# Patient Record
Sex: FEMALE | Race: WHITE | Employment: OTHER | URBAN - NONMETROPOLITAN AREA
[De-identification: names, ages, dates, MRNs, and addresses within clinical notes are randomized per-mention and may not be internally consistent; named-entity substitution may affect disease eponyms.]

---

## 2017-01-10 ENCOUNTER — LAB ENCOUNTER (OUTPATIENT)
Dept: LAB | Age: 82
End: 2017-01-10
Attending: FAMILY MEDICINE
Payer: MEDICARE

## 2017-01-10 ENCOUNTER — OFFICE VISIT (OUTPATIENT)
Dept: FAMILY MEDICINE CLINIC | Facility: CLINIC | Age: 82
End: 2017-01-10

## 2017-01-10 VITALS
BODY MASS INDEX: 30 KG/M2 | DIASTOLIC BLOOD PRESSURE: 60 MMHG | OXYGEN SATURATION: 95 % | HEART RATE: 84 BPM | TEMPERATURE: 98 F | SYSTOLIC BLOOD PRESSURE: 120 MMHG | WEIGHT: 168.25 LBS

## 2017-01-10 DIAGNOSIS — R53.81 OTHER MALAISE AND FATIGUE: ICD-10-CM

## 2017-01-10 DIAGNOSIS — R53.83 OTHER MALAISE AND FATIGUE: ICD-10-CM

## 2017-01-10 DIAGNOSIS — R53.83 OTHER MALAISE AND FATIGUE: Primary | ICD-10-CM

## 2017-01-10 DIAGNOSIS — E78.5 HYPERLIPIDEMIA, UNSPECIFIED HYPERLIPIDEMIA TYPE: ICD-10-CM

## 2017-01-10 DIAGNOSIS — R53.81 OTHER MALAISE AND FATIGUE: Primary | ICD-10-CM

## 2017-01-10 LAB
ALBUMIN SERPL-MCNC: 3.5 G/DL (ref 3.5–4.8)
ALP LIVER SERPL-CCNC: 40 U/L (ref 55–142)
ALT SERPL-CCNC: 40 U/L (ref 14–54)
AST SERPL-CCNC: 18 U/L (ref 15–41)
BASOPHILS # BLD AUTO: 0.04 X10(3) UL (ref 0–0.1)
BASOPHILS NFR BLD AUTO: 0.5 %
BILIRUB SERPL-MCNC: 0.3 MG/DL (ref 0.1–2)
BUN BLD-MCNC: 21 MG/DL (ref 8–20)
CALCIUM BLD-MCNC: 9.2 MG/DL (ref 8.3–10.3)
CHLORIDE: 105 MMOL/L (ref 101–111)
CHOLEST SMN-MCNC: 229 MG/DL (ref ?–200)
CO2: 31 MMOL/L (ref 22–32)
CREAT BLD-MCNC: 1.02 MG/DL (ref 0.55–1.02)
EOSINOPHIL # BLD AUTO: 0.2 X10(3) UL (ref 0–0.3)
EOSINOPHIL NFR BLD AUTO: 2.4 %
ERYTHROCYTE [DISTWIDTH] IN BLOOD BY AUTOMATED COUNT: 12.4 % (ref 11.5–16)
GLUCOSE BLD-MCNC: 71 MG/DL (ref 70–99)
HCT VFR BLD AUTO: 45 % (ref 34–50)
HDLC SERPL-MCNC: 73 MG/DL (ref 45–?)
HDLC SERPL: 3.14 {RATIO} (ref ?–4.44)
HGB BLD-MCNC: 14.8 G/DL (ref 12–16)
IMMATURE GRANULOCYTE COUNT: 0.06 X10(3) UL (ref 0–1)
IMMATURE GRANULOCYTE RATIO %: 0.7 %
LDLC SERPL CALC-MCNC: 101 MG/DL (ref ?–130)
LYMPHOCYTES # BLD AUTO: 1.72 X10(3) UL (ref 0.9–4)
LYMPHOCYTES NFR BLD AUTO: 20.5 %
M PROTEIN MFR SERPL ELPH: 6.5 G/DL (ref 6.1–8.3)
MCH RBC QN AUTO: 31.9 PG (ref 27–33.2)
MCHC RBC AUTO-ENTMCNC: 32.9 G/DL (ref 31–37)
MCV RBC AUTO: 97 FL (ref 81–100)
MONOCYTES # BLD AUTO: 0.87 X10(3) UL (ref 0.1–0.6)
MONOCYTES NFR BLD AUTO: 10.4 %
NEUTROPHIL ABS PRELIM: 5.49 X10 (3) UL (ref 1.3–6.7)
NEUTROPHILS # BLD AUTO: 5.49 X10(3) UL (ref 1.3–6.7)
NEUTROPHILS NFR BLD AUTO: 65.5 %
NONHDLC SERPL-MCNC: 156 MG/DL (ref ?–130)
PLATELET # BLD AUTO: 317 10(3)UL (ref 150–450)
POTASSIUM SERPL-SCNC: 4.4 MMOL/L (ref 3.6–5.1)
RBC # BLD AUTO: 4.64 X10(6)UL (ref 3.8–5.1)
RED CELL DISTRIBUTION WIDTH-SD: 43.9 FL (ref 35.1–46.3)
SODIUM SERPL-SCNC: 140 MMOL/L (ref 136–144)
TRIGLYCERIDES: 275 MG/DL (ref ?–150)
VLDL: 55 MG/DL (ref 5–40)
WBC # BLD AUTO: 8.4 X10(3) UL (ref 4–13)

## 2017-01-10 PROCEDURE — 99214 OFFICE O/P EST MOD 30 MIN: CPT | Performed by: FAMILY MEDICINE

## 2017-01-10 PROCEDURE — 36415 COLL VENOUS BLD VENIPUNCTURE: CPT

## 2017-01-10 PROCEDURE — 85025 COMPLETE CBC W/AUTO DIFF WBC: CPT

## 2017-01-10 PROCEDURE — 80053 COMPREHEN METABOLIC PANEL: CPT

## 2017-01-10 PROCEDURE — 36415 COLL VENOUS BLD VENIPUNCTURE: CPT | Performed by: FAMILY MEDICINE

## 2017-01-10 PROCEDURE — 80061 LIPID PANEL: CPT

## 2017-01-10 NOTE — PROGRESS NOTES
Van Johnson is a 80year old female. Patient presents with: Other: f/up from 12/30/16 on bronchitis. .pt still very weak, pt still coughing some. ... [employer] [facility] room 1      HPI:   Patient continues to have a cough which is worse in the morning.   She feels very prolapse, incomplete 1/12/2012     Cystocele w/incomplete uterine prolaspse   • Vitamin D deficiency 7/26/2012         Past Surgical History    TONSILLECTOMY      CHOLECYSTECTOMY      Comment gallbladder    COLONOSCOPY      Comment 3/2008 diverticulosis fa antidepressant.       Orders Placed This Encounter  CBC W Differential W Platelet [E]  Comp Metabolic Panel (14) [E]  Lipid Panel [E]  *Venipuncture    Meds & Refills for this Visit:  No prescriptions requested or ordered in this encounter    Imaging & Cons

## 2017-01-11 NOTE — PROGRESS NOTES
Quick Note:    Notify lipids normal. Recheck in 6 months. Notify chem normal including kidney function, liver function, electrolytes and nutritional markers. Recheck comprehensive panel in 6 months.   Notify CBC normal. No evidence of anemia or of an acut

## 2017-01-12 ENCOUNTER — TELEPHONE (OUTPATIENT)
Dept: FAMILY MEDICINE CLINIC | Facility: CLINIC | Age: 82
End: 2017-01-12

## 2017-01-12 DIAGNOSIS — R06.00 EXERTIONAL DYSPNEA: Primary | ICD-10-CM

## 2017-01-12 NOTE — TELEPHONE ENCOUNTER
Per [place] message the patient can go next Wednesday morning  Calling [facility] central scheduling- had to leave a detailed message.  Asked that they call me or the patient directly

## 2017-01-12 NOTE — TELEPHONE ENCOUNTER
Future Appointments  Date Time Provider Corona Olivo   1/18/2017 8:00 AM [facility] CARD TM/STRESS/ECHO RM 1 YK CARD Mike   5/22/2017 1:00 PM Zachary Mehta, DO [facility] [place]     I had used fatigue for the echo? What other diagnosis can I use?

## 2017-01-18 ENCOUNTER — HOSPITAL ENCOUNTER (OUTPATIENT)
Dept: CV DIAGNOSTICS | Age: 82
Discharge: HOME | End: 2017-01-18
Attending: FAMILY MEDICINE
Payer: MEDICARE

## 2017-01-18 DIAGNOSIS — R06.00 EXERTIONAL DYSPNEA: ICD-10-CM

## 2017-01-18 PROCEDURE — 93306 TTE W/DOPPLER COMPLETE: CPT | Performed by: INTERNAL MEDICINE

## 2017-01-18 NOTE — PROGRESS NOTES
Quick Note:    Notify echocardiogram normal. No evidence of valve problems or heart failure.  ______

## 2017-01-31 RX ORDER — SIMVASTATIN 20 MG
20 TABLET ORAL NIGHTLY
Qty: 90 TABLET | Refills: 1 | Status: SHIPPED | OUTPATIENT
Start: 2017-01-31 | End: 2017-05-06

## 2017-01-31 NOTE — TELEPHONE ENCOUNTER
From: Amy Sánchez  To: Carlos Nicole DO  Sent: 1/31/2017 8:28 AM CST  Subject: Medication Renewal Request    Original authorizing provider: DO Jamie Woo would like a refill of the following medications:  simvastatin

## 2017-03-15 ENCOUNTER — TELEPHONE (OUTPATIENT)
Dept: FAMILY MEDICINE CLINIC | Facility: CLINIC | Age: 82
End: 2017-03-15

## 2017-03-15 RX ORDER — LEVOTHYROXINE SODIUM 112 UG/1
TABLET ORAL
Qty: 90 TABLET | Refills: 1 | Status: SHIPPED
Start: 2017-03-15 | End: 2017-08-09 | Stop reason: DRUGHIGH

## 2017-03-15 NOTE — TELEPHONE ENCOUNTER
From: Ana Sanchez  To: Aris Prescott DO  Sent: 3/15/2017 11:27 AM CDT  Subject: Medication Renewal Request    Original authorizing provider: DO Pat Rosenberg would like a refill of the following medications:  Levothyroxi

## 2017-03-15 NOTE — TELEPHONE ENCOUNTER
She received a response to her med refill, but it was saying she had to open in [place] script and she doesn't know ho to do that.      I advised that she was just receiving a message advising that her script was filled and send to [facility]ELA FRANCIS[facility]   Patient verbalize

## 2017-03-21 ENCOUNTER — MED REC SCAN ONLY (OUTPATIENT)
Dept: FAMILY MEDICINE CLINIC | Facility: CLINIC | Age: 82
End: 2017-03-21

## 2017-05-06 RX ORDER — SIMVASTATIN 20 MG
20 TABLET ORAL NIGHTLY
Qty: 90 TABLET | Refills: 0 | Status: SHIPPED | OUTPATIENT
Start: 2017-05-06 | End: 2017-05-22

## 2017-05-06 NOTE — TELEPHONE ENCOUNTER
Last OV: 1/10/2017  Last labs: 1/10/2017  Last filled: 1/31/2017 #90 w 1RF    Letter sent for pt to schedule labs

## 2017-05-06 NOTE — TELEPHONE ENCOUNTER
From: Grey Carrillo  To: Mine Maldonado DO  Sent: 5/5/2017 9:27 PM CDT  Subject: Medication Renewal Request    Original authorizing provider: Efraim Challenger, DO Aneita Collet Bulson would like a refill of the following medications:  simvastatin 2

## 2017-05-22 ENCOUNTER — OFFICE VISIT (OUTPATIENT)
Dept: FAMILY MEDICINE CLINIC | Facility: CLINIC | Age: 82
End: 2017-05-22

## 2017-05-22 VITALS
SYSTOLIC BLOOD PRESSURE: 120 MMHG | TEMPERATURE: 98 F | DIASTOLIC BLOOD PRESSURE: 64 MMHG | HEIGHT: 62.5 IN | BODY MASS INDEX: 31.78 KG/M2 | HEART RATE: 87 BPM | OXYGEN SATURATION: 97 % | WEIGHT: 177.13 LBS

## 2017-05-22 DIAGNOSIS — Z00.00 ENCOUNTER FOR ANNUAL HEALTH EXAMINATION: Primary | ICD-10-CM

## 2017-05-22 DIAGNOSIS — E03.9 HYPOTHYROIDISM, UNSPECIFIED TYPE: ICD-10-CM

## 2017-05-22 DIAGNOSIS — E78.5 HYPERLIPIDEMIA, UNSPECIFIED HYPERLIPIDEMIA TYPE: ICD-10-CM

## 2017-05-22 DIAGNOSIS — H35.3290 EXUDATIVE SENILE MACULAR DEGENERATION OF RETINA (HCC): ICD-10-CM

## 2017-05-22 DIAGNOSIS — Z13.31 DEPRESSION SCREENING: ICD-10-CM

## 2017-05-22 PROCEDURE — G0444 DEPRESSION SCREEN ANNUAL: HCPCS | Performed by: FAMILY MEDICINE

## 2017-05-22 PROCEDURE — G0439 PPPS, SUBSEQ VISIT: HCPCS | Performed by: FAMILY MEDICINE

## 2017-05-22 RX ORDER — SIMVASTATIN 20 MG
20 TABLET ORAL NIGHTLY
Qty: 90 TABLET | Refills: 0 | COMMUNITY
Start: 2017-05-22 | End: 2017-08-04

## 2017-05-22 NOTE — PATIENT INSTRUCTIONS
Gary Cook's SCREENING SCHEDULE   Tests on this list are recommended by your physician but may not be covered, or covered at this frequency, by your insurer. Please check with your insurance carrier before scheduling to verify coverage.    PREVENTAT lifetime   • Anyone with a family history    Colorectal Cancer Screening  Covered up to Age 76     Colonoscopy Screen   Covered every 10 years- more often if abnormal Colonoscopy,10 Years due on 09/28/1980 Update Health Maintenance if applicable    Flex Si or performed in visit on 11/23/15  -FLU VACC [address] ANTIG   Orders placed or performed in visit on 10/28/14  -FLU VACC PRSV FREE INC ANTIG   Orders placed or performed in visit on 10/04/13  -INFLUENZA VIRUS VACCINE, PRESERV FREE, >=1YEARS OF AGE the different types of Advance Directives. It also has the State forms available on it's website for anyone to review and print using their home computer and printer. (the forms are also available in [address])  www. [employer]. org  This link also has inf

## 2017-05-22 NOTE — PROGRESS NOTES
HPI:   Corona Fuentes is a 80year old female who presents for a Medicare Subsequent Annual Wellness visit (Pt already had Initial Annual Wellness).     No complaints      Patient Care Team: Patient Care Team:  Sascha Gama DO as PCP - General (F her father; Juan Jose George in her brother and mother; Eye Problems in her mother; Heart Attack (age of onset: 67) in her father. SOCIAL HISTORY:   She  reports that she quit smoking about 52 years ago. Her smoking use included Cigarettes.  She has a 4.5 pack-year 11/30/2006   • TDAP 06/28/2016   • Unclassified Drug, Admin In Office 07/02/2013, 07/30/2013, 08/28/2013, 10/01/2013, 11/05/2013, 12/03/2013   • Zoster (Shingles) 03/16/2017       ASSESSMENT AND OTHER RELEVANT CHRONIC CONDITIONS:   Colon Ek is a 80 Fall/Risk Assessment                                                              Depression Screening (PHQ-2/PHQ-9): Over the LAST 2 WEEKS                      Advance Directives                Please go to \"Cognitive [employer]es Maintenance if applicable    Chlamydia  Annually if high risk No results found for: CHLAMYDIA No flowsheet data found.     Screening Mammogram      Mammogram Annually to 76, then as discussed Mammogram,1 Yr due on 03/17/2017 Update Health Maintenance if dorina found.    Diabetes      HgbA1C  Annually No results found for: A1C No flowsheet data found.     Creat/alb ratio  Annually      LDL  Annually LDL CHOLESTEROL (mg/dL)   Date Value   01/10/2017 101     LDL CHOLESTROL (mg/dL)   Date Value   01/29/2014 125    No

## 2017-06-08 DIAGNOSIS — E03.9 HYPOTHYROIDISM, UNSPECIFIED TYPE: Primary | ICD-10-CM

## 2017-06-08 RX ORDER — LEVOTHYROXINE SODIUM 112 UG/1
TABLET ORAL
Qty: 90 TABLET | Refills: 0 | Status: CANCELLED | OUTPATIENT
Start: 2017-06-08

## 2017-06-08 NOTE — TELEPHONE ENCOUNTER
Last office visit 5-22-17  Last refill 3-15-17 #90 with 1 refill. Last TSH 8-25-16. Repeat one year.

## 2017-06-08 NOTE — TELEPHONE ENCOUNTER
From: Freda Aggarwal  To: Fabienne Sommers DO  Sent: 6/8/2017 8:44 AM CDT  Subject: Medication Renewal Request    Original authorizing provider: DO Gibran Verma would like a refill of the following medications:  Levothyroxine

## 2017-06-09 ENCOUNTER — PATIENT MESSAGE (OUTPATIENT)
Dept: FAMILY MEDICINE CLINIC | Facility: CLINIC | Age: 82
End: 2017-06-09

## 2017-06-09 NOTE — TELEPHONE ENCOUNTER
From: Freda Aggarwal  To: Fabienne Sommers DO  Sent: 6/9/2017 9:26 AM CDT  Subject: Other    [address] called and said that my prescription is ready to be picked up. So, I guess it's okay.   thanks  [employer]

## 2017-07-05 ENCOUNTER — OFFICE VISIT (OUTPATIENT)
Dept: FAMILY MEDICINE CLINIC | Facility: CLINIC | Age: 82
End: 2017-07-05

## 2017-07-05 VITALS
BODY MASS INDEX: 31 KG/M2 | HEART RATE: 76 BPM | DIASTOLIC BLOOD PRESSURE: 60 MMHG | RESPIRATION RATE: 14 BRPM | WEIGHT: 174 LBS | SYSTOLIC BLOOD PRESSURE: 128 MMHG | TEMPERATURE: 98 F

## 2017-07-05 DIAGNOSIS — M25.511 ACUTE PAIN OF RIGHT SHOULDER: ICD-10-CM

## 2017-07-05 DIAGNOSIS — M25.561 ACUTE PAIN OF RIGHT KNEE: Primary | ICD-10-CM

## 2017-07-05 PROCEDURE — 99214 OFFICE O/P EST MOD 30 MIN: CPT | Performed by: FAMILY MEDICINE

## 2017-07-05 NOTE — PROGRESS NOTES
Rock Cha is a 80year old female. Patient presents with:  Shoulder Pain: right shoulder pain per pt  Knee Pain: right knee pain per pt      HPI:   Patient complains of pain to her right knee and her right shoulder. No specific injury.   She had fa Dexa   • Uterovaginal prolapse, incomplete 1/12/2012    Cystocele w/incomplete uterine prolaspse   • Vitamin D deficiency 7/26/2012     Past Surgical History:  No date: CHOLECYSTECTOMY      Comment: gallbladder  No date: COLONOSCOPY      Comment: 3/2008 eric pain of right knee  (primary encounter diagnosis)  Acute pain of right shoulder    I suspect she has osteoarthritis in the knee and likely in the shoulder as well. Recommend she try 2 extra strength Tylenol every morning and 2 every evening.   The stairs a

## 2017-08-04 RX ORDER — SIMVASTATIN 20 MG
20 TABLET ORAL NIGHTLY
Qty: 90 TABLET | Refills: 0 | Status: SHIPPED | OUTPATIENT
Start: 2017-08-04 | End: 2017-10-28

## 2017-08-04 NOTE — TELEPHONE ENCOUNTER
From: Jennifer Paul  Sent: 8/4/2017 9:53 AM CDT  Subject: Medication Renewal Request    Jennifer Paul would like a refill of the following medications:  simvastatin 20 MG Oral Tab    Preferred pharmacy: [address]

## 2017-08-08 ENCOUNTER — LABORATORY ENCOUNTER (OUTPATIENT)
Dept: LAB | Age: 82
End: 2017-08-08
Attending: FAMILY MEDICINE
Payer: MEDICARE

## 2017-08-08 DIAGNOSIS — R53.81 OTHER MALAISE: Primary | ICD-10-CM

## 2017-08-08 DIAGNOSIS — E55.9 VITAMIN D DEFICIENCY: ICD-10-CM

## 2017-08-08 DIAGNOSIS — Z79.899 ENCOUNTER FOR LONG-TERM (CURRENT) DRUG USE: ICD-10-CM

## 2017-08-08 DIAGNOSIS — E03.9 HYPOTHYROIDISM, UNSPECIFIED TYPE: ICD-10-CM

## 2017-08-08 DIAGNOSIS — R53.83 FATIGUE: ICD-10-CM

## 2017-08-08 LAB
25-HYDROXYVITAMIN D (TOTAL): 42.3 NG/ML (ref 30–100)
ALBUMIN SERPL-MCNC: 3.7 G/DL (ref 3.5–4.8)
ALP LIVER SERPL-CCNC: 39 U/L (ref 55–142)
ALT SERPL-CCNC: 19 U/L (ref 14–54)
AST SERPL-CCNC: 16 U/L (ref 15–41)
BASOPHILS # BLD AUTO: 0.03 X10(3) UL (ref 0–0.1)
BASOPHILS NFR BLD AUTO: 0.7 %
BILIRUB SERPL-MCNC: 0.4 MG/DL (ref 0.1–2)
BUN BLD-MCNC: 23 MG/DL (ref 8–20)
CALCIUM BLD-MCNC: 8.9 MG/DL (ref 8.3–10.3)
CHLORIDE: 106 MMOL/L (ref 101–111)
CHOLEST SMN-MCNC: 243 MG/DL (ref ?–200)
CO2: 30 MMOL/L (ref 22–32)
CREAT BLD-MCNC: 0.97 MG/DL (ref 0.55–1.02)
EOSINOPHIL # BLD AUTO: 0.1 X10(3) UL (ref 0–0.3)
EOSINOPHIL NFR BLD AUTO: 2.3 %
ERYTHROCYTE [DISTWIDTH] IN BLOOD BY AUTOMATED COUNT: 12.7 % (ref 11.5–16)
GLUCOSE BLD-MCNC: 92 MG/DL (ref 70–99)
HCT VFR BLD AUTO: 41.4 % (ref 34–50)
HDLC SERPL-MCNC: 75 MG/DL (ref 45–?)
HDLC SERPL: 3.24 {RATIO} (ref ?–4.44)
HGB BLD-MCNC: 13.1 G/DL (ref 12–16)
IMMATURE GRANULOCYTE COUNT: 0.03 X10(3) UL (ref 0–1)
IMMATURE GRANULOCYTE RATIO %: 0.7 %
LDLC SERPL CALC-MCNC: 135 MG/DL (ref ?–130)
LDLC SERPL-MCNC: 33 MG/DL (ref 5–40)
LYMPHOCYTES # BLD AUTO: 1.18 X10(3) UL (ref 0.9–4)
LYMPHOCYTES NFR BLD AUTO: 27.3 %
M PROTEIN MFR SERPL ELPH: 7 G/DL (ref 6.1–8.3)
MCH RBC QN AUTO: 30.3 PG (ref 27–33.2)
MCHC RBC AUTO-ENTMCNC: 31.6 G/DL (ref 31–37)
MCV RBC AUTO: 95.6 FL (ref 81–100)
MONOCYTES # BLD AUTO: 0.41 X10(3) UL (ref 0.1–0.6)
MONOCYTES NFR BLD AUTO: 9.5 %
NEUTROPHIL ABS PRELIM: 2.58 X10 (3) UL (ref 1.3–6.7)
NEUTROPHILS # BLD AUTO: 2.58 X10(3) UL (ref 1.3–6.7)
NEUTROPHILS NFR BLD AUTO: 59.5 %
NONHDLC SERPL-MCNC: 168 MG/DL (ref ?–130)
PLATELET # BLD AUTO: 245 10(3)UL (ref 150–450)
POTASSIUM SERPL-SCNC: 4.3 MMOL/L (ref 3.6–5.1)
RBC # BLD AUTO: 4.33 X10(6)UL (ref 3.8–5.1)
RED CELL DISTRIBUTION WIDTH-SD: 44.7 FL (ref 35.1–46.3)
SODIUM SERPL-SCNC: 143 MMOL/L (ref 136–144)
TRIGLYCERIDES: 167 MG/DL (ref ?–150)
TSI SER-ACNC: 0.2 MIU/ML (ref 0.35–5.5)
WBC # BLD AUTO: 4.3 X10(3) UL (ref 4–13)

## 2017-08-08 PROCEDURE — 82306 VITAMIN D 25 HYDROXY: CPT

## 2017-08-08 PROCEDURE — 84443 ASSAY THYROID STIM HORMONE: CPT

## 2017-08-08 PROCEDURE — 80061 LIPID PANEL: CPT

## 2017-08-08 PROCEDURE — 36415 COLL VENOUS BLD VENIPUNCTURE: CPT

## 2017-08-08 PROCEDURE — 80053 COMPREHEN METABOLIC PANEL: CPT

## 2017-08-08 PROCEDURE — 85025 COMPLETE CBC W/AUTO DIFF WBC: CPT

## 2017-08-09 DIAGNOSIS — E03.9 ACQUIRED HYPOTHYROIDISM: Primary | ICD-10-CM

## 2017-08-09 RX ORDER — LEVOTHYROXINE SODIUM 0.1 MG/1
100 TABLET ORAL
Qty: 90 TABLET | Refills: 0 | Status: SHIPPED | OUTPATIENT
Start: 2017-08-09 | End: 2017-10-28

## 2017-08-09 NOTE — PROGRESS NOTES
Notify thyroid is overtreated. Need to decrease her thyroid to 100 mcg daily. Recheck TSH in 6 weeks. Blood count is normal.  Chemistry profile is normal.  Vitamin D level is normal.  Cholesterol has increased.   Please ask if she is taking the simvastat

## 2017-08-10 ENCOUNTER — PATIENT MESSAGE (OUTPATIENT)
Dept: FAMILY MEDICINE CLINIC | Facility: CLINIC | Age: 82
End: 2017-08-10

## 2017-08-10 NOTE — TELEPHONE ENCOUNTER
From: Issac Rodarte  To: Tamy Irene DO  Sent: 8/10/2017 11:11 AM CDT  Subject: Test Results Question    This is all too complicated for me - just wondering how my sugar intake is? ?

## 2017-09-19 ENCOUNTER — LABORATORY ENCOUNTER (OUTPATIENT)
Dept: LAB | Age: 82
End: 2017-09-19
Attending: FAMILY MEDICINE
Payer: MEDICARE

## 2017-09-19 DIAGNOSIS — E03.9 ACQUIRED HYPOTHYROIDISM: ICD-10-CM

## 2017-09-19 LAB — TSI SER-ACNC: 0.48 MIU/ML (ref 0.35–5.5)

## 2017-09-19 PROCEDURE — 84443 ASSAY THYROID STIM HORMONE: CPT

## 2017-09-19 PROCEDURE — 36415 COLL VENOUS BLD VENIPUNCTURE: CPT

## 2017-10-10 ENCOUNTER — OFFICE VISIT (OUTPATIENT)
Dept: FAMILY MEDICINE CLINIC | Facility: CLINIC | Age: 82
End: 2017-10-10

## 2017-10-10 VITALS
WEIGHT: 176.13 LBS | TEMPERATURE: 97 F | DIASTOLIC BLOOD PRESSURE: 64 MMHG | HEART RATE: 8 BPM | BODY MASS INDEX: 32 KG/M2 | SYSTOLIC BLOOD PRESSURE: 130 MMHG | OXYGEN SATURATION: 97 %

## 2017-10-10 DIAGNOSIS — M15.9 PRIMARY OSTEOARTHRITIS INVOLVING MULTIPLE JOINTS: Primary | ICD-10-CM

## 2017-10-10 DIAGNOSIS — G47.9 SLEEP DIFFICULTIES: ICD-10-CM

## 2017-10-10 PROCEDURE — 90653 IIV ADJUVANT VACCINE IM: CPT | Performed by: FAMILY MEDICINE

## 2017-10-10 PROCEDURE — 99213 OFFICE O/P EST LOW 20 MIN: CPT | Performed by: FAMILY MEDICINE

## 2017-10-10 PROCEDURE — G0008 ADMIN INFLUENZA VIRUS VAC: HCPCS | Performed by: FAMILY MEDICINE

## 2017-10-10 NOTE — PROGRESS NOTES
Gogo Lemus is a 80year old female. Patient presents with: Other: Messaged  a few weeks ago wanting to discuss knee, shoulder , lower back pain. In the morning Pt doesn't feel good in the morning but feels better as the day continues.   Room 2 1/12/2012    Cystocele w/incomplete uterine prolaspse   • Vitamin D deficiency 7/26/2012     Past Surgical History:  No date: CHOLECYSTECTOMY      Comment: gallbladder  No date: COLONOSCOPY      Comment: 3/2008 diverticulosis mayo  No date: TONSILLECTOMY discussed her bowel movements. She is to drink more water.     Orders Placed This Encounter      FLU VACCINE ADJUVANT IM    Meds & Refills for this Visit:  No prescriptions requested or ordered in this encounter    Imaging & Consults:  FLU VACCINE ADJUVANT

## 2017-10-28 DIAGNOSIS — E03.9 ACQUIRED HYPOTHYROIDISM: ICD-10-CM

## 2017-10-28 RX ORDER — LEVOTHYROXINE SODIUM 0.1 MG/1
100 TABLET ORAL
Qty: 90 TABLET | Refills: 2 | Status: SHIPPED | OUTPATIENT
Start: 2017-10-28 | End: 2017-11-04

## 2017-10-28 RX ORDER — SIMVASTATIN 20 MG
20 TABLET ORAL NIGHTLY
Qty: 90 TABLET | Refills: 1 | Status: SHIPPED | OUTPATIENT
Start: 2017-10-28 | End: 2018-02-02

## 2017-10-28 NOTE — TELEPHONE ENCOUNTER
From: Christine Salter  Sent: 10/28/2017 8:42 AM CDT  Subject: Medication Renewal Request    Christine Salter would like a refill of the following medications:     simvastatin 20 MG Oral Tab Dain Nam DO]     Levothyroxine Sodium 100 MCG Oral Ta

## 2017-10-28 NOTE — TELEPHONE ENCOUNTER
Last refilled 8/17 for 90 days. ated, when do you want recheck? Thyroid labs current  Last lipids 8/17, had elevated when do you want recheck? OV 10/10  Okay to refill, recheck lipids in February.

## 2017-10-30 ENCOUNTER — TELEPHONE (OUTPATIENT)
Dept: FAMILY MEDICINE CLINIC | Facility: CLINIC | Age: 82
End: 2017-10-30

## 2017-10-30 NOTE — TELEPHONE ENCOUNTER
Phone call to Allison Davis at [facility]. States the Levothyroxine is on hold. Was \"refill too soon\" for insurance. Patient received #90 8/9. Should have about 2 weeks of medication left. Patient advised. Verbalizes understanding.

## 2017-11-04 DIAGNOSIS — E03.9 ACQUIRED HYPOTHYROIDISM: ICD-10-CM

## 2017-11-04 RX ORDER — LEVOTHYROXINE SODIUM 0.1 MG/1
100 TABLET ORAL
Qty: 90 TABLET | Refills: 2 | Status: SHIPPED
Start: 2017-11-04 | End: 2018-02-01

## 2017-11-04 NOTE — TELEPHONE ENCOUNTER
From: Ramin Pineda  Sent: 11/4/2017 8:45 AM CDT  Subject: Medication Renewal Request    Ramin Pineda would like a refill of the following medications:     Levothyroxine Sodium 100 MCG Oral Tab Eva Lion DO]    Preferred pharmacy: [facility]

## 2017-11-14 DIAGNOSIS — R53.83 FATIGUE, UNSPECIFIED TYPE: Primary | ICD-10-CM

## 2017-11-15 ENCOUNTER — LABORATORY ENCOUNTER (OUTPATIENT)
Dept: LAB | Age: 82
End: 2017-11-15
Attending: FAMILY MEDICINE
Payer: MEDICARE

## 2017-11-15 DIAGNOSIS — R53.83 FATIGUE, UNSPECIFIED TYPE: ICD-10-CM

## 2017-11-15 PROCEDURE — 36415 COLL VENOUS BLD VENIPUNCTURE: CPT

## 2017-11-15 PROCEDURE — 82607 VITAMIN B-12: CPT

## 2017-11-27 ENCOUNTER — MED REC SCAN ONLY (OUTPATIENT)
Dept: FAMILY MEDICINE CLINIC | Facility: CLINIC | Age: 82
End: 2017-11-27

## 2017-11-27 ENCOUNTER — OFFICE VISIT (OUTPATIENT)
Dept: FAMILY MEDICINE CLINIC | Facility: CLINIC | Age: 82
End: 2017-11-27

## 2017-11-27 VITALS
HEIGHT: 62.5 IN | WEIGHT: 174 LBS | SYSTOLIC BLOOD PRESSURE: 130 MMHG | BODY MASS INDEX: 31.22 KG/M2 | OXYGEN SATURATION: 98 % | HEART RATE: 87 BPM | DIASTOLIC BLOOD PRESSURE: 60 MMHG | TEMPERATURE: 98 F

## 2017-11-27 DIAGNOSIS — M77.8 TENDINITIS OF RIGHT SHOULDER: Primary | ICD-10-CM

## 2017-11-27 PROCEDURE — 99213 OFFICE O/P EST LOW 20 MIN: CPT | Performed by: FAMILY MEDICINE

## 2017-11-27 NOTE — PROGRESS NOTES
Van Johnson is a 80year old female. Patient presents with: Other: f/up on meds, discuss labs--also having bad pain in RT shoulder . Karolina Chahal ..room 2      HPI:   Patient is generally feeling well. No issues with any of her medications.   She does have some Comment: gallbladder  No date: COLONOSCOPY      Comment: 3/2008 diverticulosis mayo  No date: TONSILLECTOMY  Family History   Problem Relation Age of Onset   • Eye Problems Mother      AMD   • Cancer Mother      Breast   • Heart Attack Father 67     MI feeling generally well. She does not leave her home much because she is unable to drive. No orders of the defined types were placed in this encounter.       Meds & Refills for this Visit:  No prescriptions requested or ordered in this encounter    Imaging

## 2018-01-10 ENCOUNTER — OFFICE VISIT (OUTPATIENT)
Dept: FAMILY MEDICINE CLINIC | Facility: CLINIC | Age: 83
End: 2018-01-10

## 2018-01-10 VITALS
SYSTOLIC BLOOD PRESSURE: 130 MMHG | HEART RATE: 78 BPM | WEIGHT: 177.25 LBS | OXYGEN SATURATION: 97 % | TEMPERATURE: 98 F | DIASTOLIC BLOOD PRESSURE: 82 MMHG | BODY MASS INDEX: 32 KG/M2

## 2018-01-10 DIAGNOSIS — S46.912A STRAIN OF LEFT SHOULDER, INITIAL ENCOUNTER: Primary | ICD-10-CM

## 2018-01-10 PROCEDURE — 99213 OFFICE O/P EST LOW 20 MIN: CPT | Performed by: FAMILY MEDICINE

## 2018-01-10 NOTE — PROGRESS NOTES
Christine Salter is a 80year old female. Patient presents with: Other: LT side shoulder pains-gets shooting pains at night for the last 3 nights. ...room 3      HPI:   Patient has had what she describes as very momentary shooting pains in her left should Comment: 3/2008 diverticulosis mayo  No date: TONSILLECTOMY  Family History   Problem Relation Age of Onset   • Eye Problems Mother      AMD   • Cancer Mother      Breast   • Heart Attack Father 67     MI   • CAD[other] [OTHER] Father    • Cancer Brother do not think she needs any further workup. If it becomes more of a persistent problem will need to reevaluate. No orders of the defined types were placed in this encounter.       Meds & Refills for this Visit:  No prescriptions requested or ordered in thi

## 2018-01-17 ENCOUNTER — OFFICE VISIT (OUTPATIENT)
Dept: NEUROLOGY | Facility: CLINIC | Age: 83
End: 2018-01-17

## 2018-01-17 ENCOUNTER — TELEPHONE (OUTPATIENT)
Dept: FAMILY MEDICINE CLINIC | Facility: CLINIC | Age: 83
End: 2018-01-17

## 2018-01-17 VITALS
WEIGHT: 174 LBS | SYSTOLIC BLOOD PRESSURE: 136 MMHG | RESPIRATION RATE: 18 BRPM | HEART RATE: 74 BPM | DIASTOLIC BLOOD PRESSURE: 78 MMHG | BODY MASS INDEX: 31 KG/M2

## 2018-01-17 DIAGNOSIS — G20 PARKINSON'S DISEASE (HCC): Primary | ICD-10-CM

## 2018-01-17 PROBLEM — G20.A1 PARKINSON'S DISEASE: Status: ACTIVE | Noted: 2018-01-17

## 2018-01-17 PROBLEM — G20.A1 PARKINSON'S DISEASE (HCC): Status: ACTIVE | Noted: 2018-01-17

## 2018-01-17 PROCEDURE — 99205 OFFICE O/P NEW HI 60 MIN: CPT | Performed by: OTHER

## 2018-01-17 NOTE — PATIENT INSTRUCTIONS
After your visit at the [place] office  today,  please direct any follow up questions or medication needs to the staff in our [place] office so that your concerns may be promptly addressed.   We are available through [address] or at the numbers below: until this office has notified you that the test has been approved by your insurer. Depending on your insurance carrier, approval may take 3-10 days. It is highly recommended patients contact their insurance carrier directly to determine coverage.   If sandor

## 2018-01-17 NOTE — TELEPHONE ENCOUNTER
Aishwarya Wells, AND HE SAID SHE NEED AN MRI, WHERE DOES  [facility] RECOMMEND SHE GET THAT DONE AT?

## 2018-01-17 NOTE — PROGRESS NOTES
Neurology H&P    [address] [place] [address] Patient Status:  No patient class for patient encounter    1930 MRN [id number]   Location ED [facility] Attending No att. providers found   Hosp Day # 0 PCP Wendel Alpers, DO     Subjective:  Cipriano Salcido Exudative senile macular degeneration of retina ([facility] [address] 9/14/2011   • HYPERLIPIDEMIA    • HYPERTHYROIDISM    • Lumbosacral spondylosis without myelopathy 9/29/2008    Osteoarthritis Lumbosacral spine   • Nondisplaced fracture of fifth left metatarsal bone 9/ murmur  Pulm: CTAB  GI: non-tender, normal bowel sounds  Skin: normal, dry  Extremities: No clubbing or cyanosis      Neurologic:   MENTAL STATUS: alert, ox3, normal attention, language and fund of knowledge.       CRANIAL NERVES II to XII: PERRLA, no ptosi - We discussed possible side effects in detail       - Pt will call back to clinic in 5-10 days and let me know how they are tolerating this medication  - MRI brain ww/o      Jeanie Jasso, DO  Neurology

## 2018-01-17 NOTE — TELEPHONE ENCOUNTER
Dr Yasmin Oliver placed the MRI order  The patient should stay within [facility] or the [facility] in [facility] the patient to advise- [place] would be closer  I gave her the number for central scheduling and she will call to schedule

## 2018-01-24 ENCOUNTER — HOSPITAL ENCOUNTER (OUTPATIENT)
Dept: MRI IMAGING | Age: 83
Discharge: HOME | End: 2018-01-24
Attending: Other
Payer: MEDICARE

## 2018-01-24 DIAGNOSIS — G20 PARKINSON'S DISEASE (HCC): ICD-10-CM

## 2018-01-24 PROCEDURE — A9575 INJ GADOTERATE MEGLUMI 0.1ML: HCPCS | Performed by: OTHER

## 2018-01-24 PROCEDURE — 70553 MRI BRAIN STEM W/O & W/DYE: CPT | Performed by: OTHER

## 2018-01-25 ENCOUNTER — TELEPHONE (OUTPATIENT)
Dept: NEUROLOGY | Facility: CLINIC | Age: 83
End: 2018-01-25

## 2018-01-25 NOTE — TELEPHONE ENCOUNTER
----- Message from Zabrina Booker DO sent at 1/25/2018 10:12 AM CST -----  MRI imaging was reviewed. Small chronic R basal ganglia infarct but no acute process.  She should start a daily Aspirin 81mg Qday

## 2018-01-25 NOTE — TELEPHONE ENCOUNTER
No if she discontinued it due to macular degeneration and intra opthalmic injections etc. Then continue to hold ASA and we can discuss this at her next clinic visit

## 2018-01-25 NOTE — TELEPHONE ENCOUNTER
Relayed doctors recommendations to patient. Patient verbalized understanding and has no questions or concerns at this time.

## 2018-01-25 NOTE — TELEPHONE ENCOUNTER
Relayed test results and doctors recommendations to patient. Patient verbalized understanding. Patient states she was on Aspirin 81mg daily, but other provider directed her to discontinue due to Macular Degeneration diagnosis.      Informed the patient

## 2018-01-31 ENCOUNTER — TELEPHONE (OUTPATIENT)
Dept: NEUROLOGY | Facility: CLINIC | Age: 83
End: 2018-01-31

## 2018-01-31 DIAGNOSIS — G20 PARKINSON'S DISEASE (HCC): ICD-10-CM

## 2018-01-31 NOTE — TELEPHONE ENCOUNTER
Spoke with patient and relayed message to increase sinemet dose from Dr. Anay Douglas. Pt verbalized understanding, agrees to plan and expresses intent to comply with advice given.   Answered all questions and pt was encouraged to call office with any addition

## 2018-01-31 NOTE — TELEPHONE ENCOUNTER
Pt calling to report that she hasn't noticed a difference in her tremors since starting the sinemet 25/100 two weeks ago. Pt is currently taking 0.5 tab TID. Informed pt we will update Dr. Lacie Hook and c/b with recommendations.   She verbalized Shruthi

## 2018-02-01 DIAGNOSIS — E03.9 ACQUIRED HYPOTHYROIDISM: ICD-10-CM

## 2018-02-01 RX ORDER — LEVOTHYROXINE SODIUM 0.1 MG/1
100 TABLET ORAL
Qty: 90 TABLET | Refills: 1 | Status: SHIPPED | OUTPATIENT
Start: 2018-02-01 | End: 2018-08-01

## 2018-02-01 NOTE — TELEPHONE ENCOUNTER
From: Mai Arredondo  Sent: 2/1/2018 9:50 AM CST  Subject: Medication Renewal Request    Mai Arredondo would like a refill of the following medications:     Levothyroxine Sodium 100 MCG Oral Tab Uma Matias DO]    Preferred pharmacy: [facility] [place]

## 2018-02-01 NOTE — TELEPHONE ENCOUNTER
Last office visit 1-10-18  Last refill 11-4-17 #90 with 2 (script not printed)  TSH due in September.

## 2018-02-02 RX ORDER — SIMVASTATIN 20 MG
20 TABLET ORAL NIGHTLY
Qty: 30 TABLET | Refills: 0 | Status: SHIPPED | OUTPATIENT
Start: 2018-02-02 | End: 2018-03-08

## 2018-02-02 NOTE — TELEPHONE ENCOUNTER
Last office visit: 1/17/2018  Last B/P taken 1/17/2018: 136/78  Last CMP and Lipid: 8/8/2017    Last refill: 10/28/2017    Pending Prescriptions Disp Refills    simvastatin 20 MG Oral Tab 90 tablet 1     Sig: Take 1 tablet (20 mg total) by mouth nightly.          Future Appointments  Date Time Provider Corona Olivo   3/14/2018 2:00 PM DO PERNELL Parker

## 2018-02-02 NOTE — TELEPHONE ENCOUNTER
From: Asael Zabala  Sent: 2/2/2018 4:09 PM CST  Subject: Medication Renewal Request    Asael Zabala would like a refill of the following medications:     simvastatin 20 MG Oral Tab Siobhan Lord DO]    Preferred pharmacy: [address] [place] [address] [facility], [address] [place] [address] [phone], [phone]

## 2018-02-23 ENCOUNTER — TELEPHONE (OUTPATIENT)
Dept: FAMILY MEDICINE CLINIC | Facility: CLINIC | Age: 83
End: 2018-02-23

## 2018-02-23 ENCOUNTER — TELEPHONE (OUTPATIENT)
Dept: NEUROLOGY | Facility: CLINIC | Age: 83
End: 2018-02-23

## 2018-02-23 DIAGNOSIS — G20 PARKINSON'S DISEASE (HCC): ICD-10-CM

## 2018-02-23 NOTE — TELEPHONE ENCOUNTER
LMTCB. Clarifying if she is still taking , 1 tab TID before calling over to pharmacy. Was placed as historical on 1/31/18, no new Rx sent at that time.

## 2018-02-23 NOTE — TELEPHONE ENCOUNTER
Dr Summer Pace prescribes carb/levo for her parkinsons. She needs a rf, will [facility] Schmidt fill?  She takes 3 whole pills a day, call Jerrald Corson

## 2018-02-23 NOTE — TELEPHONE ENCOUNTER
Patient called back. States she is taking Carbidopa- Levodopa  mg tabs , one tab TID. Patient stated she has only a couple pills left and will run out, requesting Rx to be sent to pharmacy today.      Medication: Sinemet     Date of last refill: Unkno

## 2018-02-23 NOTE — TELEPHONE ENCOUNTER
Calling to see if she contacted Dr Hickman Labor office -after two weeks it was changed from 1/2 tablet to a full tablet and now she has run out. They will not fill because they say it is too soon.      I asked if she called Dr Hickman Labor office - they advised

## 2018-03-08 DIAGNOSIS — E78.5 HYPERLIPIDEMIA, UNSPECIFIED HYPERLIPIDEMIA TYPE: Primary | ICD-10-CM

## 2018-03-09 RX ORDER — SIMVASTATIN 20 MG
20 TABLET ORAL NIGHTLY
Qty: 30 TABLET | Refills: 0 | Status: SHIPPED | OUTPATIENT
Start: 2018-03-09 | End: 2018-04-09

## 2018-03-09 NOTE — TELEPHONE ENCOUNTER
From: Liz Holguin  Sent: 3/8/2018 7:41 PM CST  Subject: Medication Renewal Request    Liz Poag would like a refill of the following medications:     simvastatin 20 MG Oral Tab Kaylee Diop DO]    Preferred pharmacy: [address] [place] [address]

## 2018-03-14 ENCOUNTER — OFFICE VISIT (OUTPATIENT)
Dept: NEUROLOGY | Facility: CLINIC | Age: 83
End: 2018-03-14

## 2018-03-14 VITALS
BODY MASS INDEX: 31.04 KG/M2 | WEIGHT: 173 LBS | SYSTOLIC BLOOD PRESSURE: 128 MMHG | HEIGHT: 62.5 IN | RESPIRATION RATE: 16 BRPM | DIASTOLIC BLOOD PRESSURE: 68 MMHG | HEART RATE: 74 BPM

## 2018-03-14 DIAGNOSIS — G20 PARKINSON'S DISEASE (HCC): Primary | ICD-10-CM

## 2018-03-14 PROCEDURE — 99214 OFFICE O/P EST MOD 30 MIN: CPT | Performed by: OTHER

## 2018-03-14 NOTE — PROGRESS NOTES
Neurology H&P    [address] [place] [address] Patient Status:  No patient class for patient encounter    1930 MRN [id number]   Location [address] [facility] Attending No att. providers found   Hosp Day # 0 PCP Cachorro DaviesrDO     Subjective:  Initial Cli capsules by mouth daily.    Disp:  Rfl:    docusate sodium (COLACE) 100 MG Oral Cap Takes 1 to 3 caps daily; available OTC Disp: 10 capsule Rfl: 0   Multiple Vitamins-Minerals (PRESERVISION AREDS 2 OR) 2 tablets daily Disp:  Rfl:        Problem List:  Anne-Marie pain  : denies frequent urination or difficulty urinating   Heme: no new bruising, no unexplained fevers or chills  Endocrine: no unexplained weight loss or gain, no new fatigue  Musculoskeletal: denies back pain, denies joint pain  Psych: denies depress disease. 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 Assessment: This is an 81 y/o female with RUE resting tremor and Jaw tremor. Her exam as noted above is consistent with an early parkinson disease or PD PLUS.  She is doing well on Sinemet

## 2018-03-14 NOTE — PATIENT INSTRUCTIONS
After your visit at the [place] office  today,  please direct any follow up questions or medication needs to the staff in our [place] office so that your concerns may be promptly addressed.   We are available through [employer] or at the numbers below:

## 2018-03-16 ENCOUNTER — TELEPHONE (OUTPATIENT)
Dept: NEUROLOGY | Facility: CLINIC | Age: 83
End: 2018-03-16

## 2018-03-16 NOTE — TELEPHONE ENCOUNTER
Good. She should be on ASA 81mg Qday for secondary stroke prevention for incidental lacunar infarct in R BG seen on MRI

## 2018-03-22 DIAGNOSIS — G20 PARKINSON'S DISEASE (HCC): ICD-10-CM

## 2018-03-23 NOTE — TELEPHONE ENCOUNTER
Medication: carbidopa-levodopa  MG Oral Tab     Date of last refill: 3/14/18  (#90/6)    Medication was filled 10 days ago. Contacted pharmacy and they confirmed receipt of this Rx.   Stated that it had not been picked up after it was sent, so it was

## 2018-03-23 NOTE — TELEPHONE ENCOUNTER
From: Jackie Cook  Sent: 3/22/2018 8:30 AM CDT  Subject: Medication Renewal Request    Gideon Shone would like a refill of the following medications:     carbidopa-levodopa  MG Oral Tab LARS Liao    Preferred pharmacy: [facility]

## 2018-04-09 DIAGNOSIS — E78.5 HYPERLIPIDEMIA, UNSPECIFIED HYPERLIPIDEMIA TYPE: ICD-10-CM

## 2018-04-09 RX ORDER — SIMVASTATIN 20 MG
20 TABLET ORAL NIGHTLY
Qty: 30 TABLET | Refills: 0 | Status: SHIPPED | OUTPATIENT
Start: 2018-04-09 | End: 2018-05-10

## 2018-04-09 NOTE — TELEPHONE ENCOUNTER
Future Appointments  Date Time Provider Corona Pallavi   5/25/2018 11:00 AM Chrissie Milligan, DO [facility] [place]     Patient is due for her labs

## 2018-04-09 NOTE — TELEPHONE ENCOUNTER
From: Jennifer Lynch  Sent: 4/9/2018 9:30 AM CDT  Subject: Medication Renewal Request    Jennifer Lynch would like a refill of the following medications:     simvastatin 20 MG Oral Tab LARS Jasmine    Preferred pharmacy: [address] [place] [address]

## 2018-04-20 DIAGNOSIS — G20 PARKINSON'S DISEASE (HCC): ICD-10-CM

## 2018-04-20 NOTE — TELEPHONE ENCOUNTER
Patient given refill on 3/14/18 for #90/6 additional refills. Should have refills on file at the pharmacy.  Responded to [employer] request.

## 2018-04-20 NOTE — TELEPHONE ENCOUNTER
From: Grey Carrillo  Sent: 4/20/2018 8:55 AM CDT  Subject: Medication Renewal Request    Grey Carrillo would like a refill of the following medications:     carbidopa-levodopa  MG Oral Tab Cezar Neal DO]    Preferred pharmacy: [facility]

## 2018-05-10 DIAGNOSIS — E78.5 HYPERLIPIDEMIA, UNSPECIFIED HYPERLIPIDEMIA TYPE: ICD-10-CM

## 2018-05-11 RX ORDER — SIMVASTATIN 20 MG
20 TABLET ORAL NIGHTLY
Qty: 30 TABLET | Refills: 0 | Status: SHIPPED | OUTPATIENT
Start: 2018-05-11 | End: 2018-06-08

## 2018-05-11 NOTE — TELEPHONE ENCOUNTER
From: Samuel Buenrostro  Sent: 5/10/2018 6:33 PM CDT  Subject: Medication Renewal Request    Samuel Buenrostro would like a refill of the following medications:     simvastatin 20 MG Oral Tab Claudia Richard DO]    Preferred [facility]: [facility]

## 2018-05-11 NOTE — TELEPHONE ENCOUNTER
Labs were due in March.      Future Appointments  Date Time Provider Corona Olivo   5/25/2018 11:00 AM Deep Adams, DO [facility] [place]     I will let the patient know

## 2018-05-24 DIAGNOSIS — G20.A1 PARKINSON'S DISEASE: ICD-10-CM

## 2018-05-25 ENCOUNTER — OFFICE VISIT (OUTPATIENT)
Dept: FAMILY MEDICINE CLINIC | Facility: CLINIC | Age: 83
End: 2018-05-25

## 2018-05-25 ENCOUNTER — APPOINTMENT (OUTPATIENT)
Dept: LAB | Age: 83
End: 2018-05-25
Attending: FAMILY MEDICINE
Payer: MEDICARE

## 2018-05-25 ENCOUNTER — PATIENT MESSAGE (OUTPATIENT)
Dept: NEUROLOGY | Facility: CLINIC | Age: 83
End: 2018-05-25

## 2018-05-25 VITALS
WEIGHT: 175.5 LBS | HEART RATE: 78 BPM | BODY MASS INDEX: 30.71 KG/M2 | SYSTOLIC BLOOD PRESSURE: 120 MMHG | TEMPERATURE: 98 F | DIASTOLIC BLOOD PRESSURE: 66 MMHG | HEIGHT: 63.5 IN | OXYGEN SATURATION: 96 %

## 2018-05-25 DIAGNOSIS — E78.5 HYPERLIPIDEMIA, UNSPECIFIED HYPERLIPIDEMIA TYPE: ICD-10-CM

## 2018-05-25 DIAGNOSIS — E78.5 HYPERLIPIDEMIA, UNSPECIFIED HYPERLIPIDEMIA TYPE: Primary | ICD-10-CM

## 2018-05-25 DIAGNOSIS — Z13.31 DEPRESSION SCREENING: ICD-10-CM

## 2018-05-25 DIAGNOSIS — Z00.00 ENCOUNTER FOR ANNUAL HEALTH EXAMINATION: ICD-10-CM

## 2018-05-25 DIAGNOSIS — E03.9 HYPOTHYROIDISM, UNSPECIFIED TYPE: ICD-10-CM

## 2018-05-25 PROCEDURE — 36415 COLL VENOUS BLD VENIPUNCTURE: CPT | Performed by: FAMILY MEDICINE

## 2018-05-25 PROCEDURE — 80053 COMPREHEN METABOLIC PANEL: CPT

## 2018-05-25 PROCEDURE — G0439 PPPS, SUBSEQ VISIT: HCPCS | Performed by: FAMILY MEDICINE

## 2018-05-25 PROCEDURE — 80061 LIPID PANEL: CPT

## 2018-05-25 PROCEDURE — 36415 COLL VENOUS BLD VENIPUNCTURE: CPT

## 2018-05-25 PROCEDURE — G0444 DEPRESSION SCREEN ANNUAL: HCPCS | Performed by: FAMILY MEDICINE

## 2018-05-25 PROCEDURE — 84443 ASSAY THYROID STIM HORMONE: CPT

## 2018-05-25 NOTE — TELEPHONE ENCOUNTER
From: Sheila Jimenez RN  To: Jesusita Dior  Sent: 5/25/2018 8:17 AM CDT  Subject: Refill Request    Alireza Joseph,    We received a refill request for Sinemet (Carbidopa-Levodopa).     According to our records, we sent a Sinemet prescription to [facility]

## 2018-05-25 NOTE — TELEPHONE ENCOUNTER
From: Meeta Nguyen  Sent: 5/24/2018 5:46 PM CDT  Subject: Medication Renewal Request    Meeta Nguyen would like a refill of the following medications:     carbidopa-levodopa  MG Oral Tab Warner Maldonado, ]    Preferred pharmacy: [place] [address] [place] [facility], [address] [place] [address] [phone], [phone]

## 2018-05-25 NOTE — PROGRESS NOTES
HPI:   Gogo Lemus is a 80year old female who presents for a Medicare Subsequent Annual Wellness visit (Pt already had Initial Annual Wellness).     No complaints          Fall/Risk Assessment   She has been screened for Falls and is low risk: Fall/  for [facility] on file in [employer]. She smoked tobacco in the past but quit greater than 12 months ago.   Smoking status: Former Smoker                                                              Packs/day: 0.30      Years: 15.00        Types: Ci (COLACE) 100 MG Oral Cap Takes 1 to 3 caps daily; available OTC   Multiple Vitamins-Minerals (PRESERVISION AREDS 2 OR) 2 tablets daily     Current Facility-Administered Medications for the 5/25/18 encounter (Office Visit) with Bela Jones DO:  meg 11/04/2014, 01/20/2015, 03/17/2015, 05/12/2015, 07/14/2015, 09/08/2015, 09/08/2015   • Fluad High dose 65 yr and older (02318) 10/10/2017   • HIGH DOSE FLU 65 YRS AND OLDER PRSV FREE SINGLE D (34064) FLU CLINIC 11/23/2015, 11/16/2016   • Influenza 11/03/20 In the past six months, have you lost more than 10 pounds without trying?: 2 - No  Has your appetite been poor?: No  How does the patient maintain a good energy level?: Other  How would you describe your daily physical activity?: Light  How would you davis Health Maintenance if applicable    Chlamydia  Annually if high risk No results found for: CHLAMYDIA No flowsheet data found.     Screening Mammogram      Mammogram Annually to 76, then as discussed There are no preventive care reminders to display for this

## 2018-05-25 NOTE — PATIENT INSTRUCTIONS
Clementina Cook's SCREENING SCHEDULE   Tests on this list are recommended by your physician but may not be covered, or covered at this frequency, by your insurer. Please check with your insurance carrier before scheduling to verify coverage.    LUIS following criteria:   • Men who are 73-68 years old and have smoked more than 100 cigarettes in their lifetime   • Anyone with a family history    Colorectal Cancer Screening  Covered up to Age 76     Colonoscopy Screen   Covered every 10 years- more often Mammogram regularly   Immunizations      Influenza  Covered Annually   Orders placed or performed in visit on 11/16/16  -FLU VACC PRSV FREE INC ANTIG   Orders placed or performed in visit on 11/23/15  -FLU VACC PRSV FREE INC ANTIG   Orders placed or perfor website. http://www. idph.state. [place]/public/books/advin.htm  A link to the [employer]. This site has a lot of good information including definitions of the different types of Advance Directives.  It also has the [facility] forms available

## 2018-06-08 DIAGNOSIS — E78.5 HYPERLIPIDEMIA, UNSPECIFIED HYPERLIPIDEMIA TYPE: ICD-10-CM

## 2018-06-08 RX ORDER — SIMVASTATIN 20 MG
20 TABLET ORAL NIGHTLY
Qty: 90 TABLET | Refills: 3 | Status: SHIPPED
Start: 2018-06-08 | End: 2018-10-25

## 2018-06-08 NOTE — TELEPHONE ENCOUNTER
From: Jennifer Lynch  Sent: 6/8/2018 10:29 AM CDT  Subject: Medication Renewal Request    Jennifer Lynch would like a refill of the following medications:     simvastatin 20 MG Oral Tab Antony Christianson DO]    Preferred pharmacy: [facility]

## 2018-08-01 DIAGNOSIS — E03.9 ACQUIRED HYPOTHYROIDISM: ICD-10-CM

## 2018-08-01 RX ORDER — LEVOTHYROXINE SODIUM 0.1 MG/1
100 TABLET ORAL
Qty: 90 TABLET | Refills: 2 | Status: SHIPPED | OUTPATIENT
Start: 2018-08-01 | End: 2019-04-27

## 2018-08-01 NOTE — TELEPHONE ENCOUNTER
From: Teddy Barrera  Sent: 8/1/2018 4:48 PM CDT  Subject: Medication Renewal Request    Teddy Barrera would like a refill of the following medications:     Levothyroxine Sodium 100 MCG Oral Tab Kristin Quiles DO]    Preferred pharmacy: [facility]

## 2018-09-14 ENCOUNTER — TELEPHONE (OUTPATIENT)
Dept: NEUROLOGY | Facility: CLINIC | Age: 83
End: 2018-09-14

## 2018-09-19 ENCOUNTER — MED REC SCAN ONLY (OUTPATIENT)
Dept: FAMILY MEDICINE CLINIC | Facility: CLINIC | Age: 83
End: 2018-09-19

## 2018-09-19 ENCOUNTER — IMMUNIZATION (OUTPATIENT)
Dept: FAMILY MEDICINE CLINIC | Facility: CLINIC | Age: 83
End: 2018-09-19
Payer: MEDICARE

## 2018-09-19 PROCEDURE — G0008 ADMIN INFLUENZA VIRUS VAC: HCPCS | Performed by: FAMILY MEDICINE

## 2018-09-19 PROCEDURE — 90653 IIV ADJUVANT VACCINE IM: CPT | Performed by: FAMILY MEDICINE

## 2018-10-17 ENCOUNTER — OFFICE VISIT (OUTPATIENT)
Dept: NEUROLOGY | Facility: CLINIC | Age: 83
End: 2018-10-17
Payer: MEDICARE

## 2018-10-17 VITALS
RESPIRATION RATE: 16 BRPM | HEIGHT: 63.5 IN | DIASTOLIC BLOOD PRESSURE: 70 MMHG | HEART RATE: 66 BPM | WEIGHT: 175 LBS | BODY MASS INDEX: 30.62 KG/M2 | SYSTOLIC BLOOD PRESSURE: 130 MMHG

## 2018-10-17 DIAGNOSIS — G20 PARKINSON'S DISEASE (HCC): Primary | ICD-10-CM

## 2018-10-17 PROCEDURE — 99213 OFFICE O/P EST LOW 20 MIN: CPT | Performed by: OTHER

## 2018-10-17 NOTE — PATIENT INSTRUCTIONS
After your visit at the [facility] office  today,  please direct any follow up questions or medication needs to the staff in our [place] office so that your concerns may be promptly addressed.   We are available through [email or web address] or at the numbers below:

## 2018-10-17 NOTE — PROGRESS NOTES
Neurology H&P    [address] [place] [address] Patient Status:  No patient class for patient encounter    1930 MRN [id number]   Location [facility] Attending No att. providers found   Hosp Day # 0 PCP Javon Teran DO     Subjective:  Initial Cli Cholecalciferol (VITAMIN D) 1000 UNITS Oral Tab Take 4 capsules by mouth daily.    Disp:  Rfl:    docusate sodium (COLACE) 100 MG Oral Cap Takes 1 to 3 caps daily; available OTC Disp: 10 capsule Rfl: 0   Multiple Vitamins-Minerals (PRESERVISION AREDS 2 OR discharge  Pulmonary: no SOB, no new cough  CV: no chest pain, no new lower extremity swelling  GI: no constipation or abdominal pain  : denies frequent urination or difficulty urinating   Heme: no new bruising, no unexplained fevers or chills  Endocrine Mild chronic small vessel ischemic disease. 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 Assessment: This is an 81 y/o female with RUE resting tremor and Jaw tremor.  Her exam as noted above is consistent with an early parkinson disease or PD

## 2018-10-25 DIAGNOSIS — E78.5 HYPERLIPIDEMIA, UNSPECIFIED HYPERLIPIDEMIA TYPE: ICD-10-CM

## 2018-10-25 RX ORDER — SIMVASTATIN 20 MG
20 TABLET ORAL NIGHTLY
Qty: 90 TABLET | Refills: 2 | Status: SHIPPED | OUTPATIENT
Start: 2018-10-25 | End: 2019-12-03

## 2018-10-28 DIAGNOSIS — E03.9 ACQUIRED HYPOTHYROIDISM: ICD-10-CM

## 2018-10-29 ENCOUNTER — TELEPHONE (OUTPATIENT)
Dept: FAMILY MEDICINE CLINIC | Facility: CLINIC | Age: 83
End: 2018-10-29

## 2018-10-29 RX ORDER — LEVOTHYROXINE SODIUM 0.1 MG/1
100 TABLET ORAL
Qty: 90 TABLET | Refills: 2 | OUTPATIENT
Start: 2018-10-29

## 2018-10-29 NOTE — TELEPHONE ENCOUNTER
Calling to advise that there are refills at [facility]. She has a hard time getting someone to  her script. She will try to get them filled once she has someone that can pick them up for her and the pharmacy keeps saying that they can't fill yet?

## 2019-02-11 ENCOUNTER — TELEPHONE (OUTPATIENT)
Dept: FAMILY MEDICINE CLINIC | Facility: CLINIC | Age: 84
End: 2019-02-11

## 2019-02-11 NOTE — TELEPHONE ENCOUNTER
It appears that the message was a reminder about her appointment with Dr Freddie Evans on 2/13/19 1:20pm     Calling the patient to advise- she has contacted the department to help her with her MyChart

## 2019-02-13 ENCOUNTER — OFFICE VISIT (OUTPATIENT)
Dept: NEUROLOGY | Facility: CLINIC | Age: 84
End: 2019-02-13
Payer: MEDICARE

## 2019-02-13 VITALS
BODY MASS INDEX: 30.8 KG/M2 | WEIGHT: 176 LBS | HEART RATE: 60 BPM | RESPIRATION RATE: 12 BRPM | HEIGHT: 63.5 IN | DIASTOLIC BLOOD PRESSURE: 80 MMHG | SYSTOLIC BLOOD PRESSURE: 120 MMHG | TEMPERATURE: 99 F

## 2019-02-13 DIAGNOSIS — G20 PARKINSON'S DISEASE (HCC): Primary | ICD-10-CM

## 2019-02-13 PROCEDURE — 99213 OFFICE O/P EST LOW 20 MIN: CPT | Performed by: OTHER

## 2019-02-13 NOTE — PROGRESS NOTES
Neurology H&P    [address] [place] [address] Patient Status:  No patient class for patient encounter    1930 MRN [id number]   Location ED [facility] Attending No att. providers found   Hosp Day # 0 PCP Jude Cristina DO     Subjective:  Initial Cli Cholecalciferol (VITAMIN D) 1000 UNITS Oral Tab Take 4 capsules by mouth daily.    Disp:  Rfl:    docusate sodium (COLACE) 100 MG Oral Cap Takes 1 to 3 caps daily; available OTC Disp: 10 capsule Rfl: 0   Multiple Vitamins-Minerals (PRESERVISION AREDS 2 OR discharge  Pulmonary: no SOB, no new cough  CV: no chest pain, no new lower extremity swelling  GI: no constipation or abdominal pain  : denies frequent urination or difficulty urinating   Heme: no new bruising, no unexplained fevers or chills  Endocrine 1/2018  =====  CONCLUSION:    1. No acute infarct or intracranial hemorrhage. 2. Mild chronic small vessel ischemic disease. 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 Assessment:   This is an 79 y/o female with RUE resting tremor and Jaw tr

## 2019-04-27 DIAGNOSIS — E03.9 ACQUIRED HYPOTHYROIDISM: ICD-10-CM

## 2019-04-27 RX ORDER — LEVOTHYROXINE SODIUM 0.1 MG/1
TABLET ORAL
Qty: 90 TABLET | Refills: 0 | Status: SHIPPED | OUTPATIENT
Start: 2019-04-27 | End: 2019-05-29 | Stop reason: DRUGHIGH

## 2019-04-27 RX ORDER — LEVOTHYROXINE SODIUM 0.1 MG/1
100 TABLET ORAL
Qty: 90 TABLET | Refills: 2 | OUTPATIENT
Start: 2019-04-27

## 2019-04-27 NOTE — TELEPHONE ENCOUNTER
Labs due in May 2019     Future Appointments   Date Time Provider Corona Olivo   5/28/2019 10:00 AM Prakash Palacios, DO [facility] [place]   5/28/2019 10:30 AM REF [facility] REF [facility] Ref Lab [facility]

## 2019-05-28 ENCOUNTER — APPOINTMENT (OUTPATIENT)
Dept: LAB | Age: 84
End: 2019-05-28
Attending: FAMILY MEDICINE
Payer: MEDICARE

## 2019-05-28 ENCOUNTER — OFFICE VISIT (OUTPATIENT)
Dept: FAMILY MEDICINE CLINIC | Facility: CLINIC | Age: 84
End: 2019-05-28
Payer: MEDICARE

## 2019-05-28 VITALS
BODY MASS INDEX: 31.5 KG/M2 | HEIGHT: 63.5 IN | HEART RATE: 80 BPM | SYSTOLIC BLOOD PRESSURE: 110 MMHG | TEMPERATURE: 98 F | DIASTOLIC BLOOD PRESSURE: 60 MMHG | WEIGHT: 180 LBS | OXYGEN SATURATION: 98 %

## 2019-05-28 DIAGNOSIS — E78.5 HYPERLIPIDEMIA, UNSPECIFIED HYPERLIPIDEMIA TYPE: ICD-10-CM

## 2019-05-28 DIAGNOSIS — Z00.00 ENCOUNTER FOR ANNUAL HEALTH EXAMINATION: Primary | ICD-10-CM

## 2019-05-28 DIAGNOSIS — E03.9 ACQUIRED HYPOTHYROIDISM: ICD-10-CM

## 2019-05-28 DIAGNOSIS — Z13.31 DEPRESSION SCREENING: ICD-10-CM

## 2019-05-28 PROCEDURE — G0444 DEPRESSION SCREEN ANNUAL: HCPCS | Performed by: FAMILY MEDICINE

## 2019-05-28 PROCEDURE — 80061 LIPID PANEL: CPT

## 2019-05-28 PROCEDURE — G0439 PPPS, SUBSEQ VISIT: HCPCS | Performed by: FAMILY MEDICINE

## 2019-05-28 PROCEDURE — 36415 COLL VENOUS BLD VENIPUNCTURE: CPT

## 2019-05-28 PROCEDURE — 80053 COMPREHEN METABOLIC PANEL: CPT

## 2019-05-28 PROCEDURE — 84443 ASSAY THYROID STIM HORMONE: CPT

## 2019-05-28 NOTE — PROGRESS NOTES
HPI:   Ying Ivy is a 80year old female who presents for a Medicare Subsequent Annual Wellness visit (Pt already had Initial Annual Wellness).     No complaints         Fall/Risk Assessment abnormal    She has been screened for Falls and is High R Epic.     She smoked tobacco in the past but quit greater than 12 months ago.   Social History    Tobacco Use      Smoking status: Former Smoker        Packs/day: 0.30        Years: 15.00        Pack years: 4.5        Types: Cigarettes        Quit date: 1/1 Oral Tab Take 4 capsules by mouth daily.      docusate sodium (COLACE) 100 MG Oral Cap Takes 1 to 3 caps daily; available OTC   Multiple Vitamins-Minerals (PRESERVISION AREDS 2 OR) 2 tablets daily     Current Facility-Administered Medications for the 5/28/1 02/04/2014, 04/09/2014, 06/03/2014, 09/02/2014, 11/04/2014, 01/20/2015, 03/17/2015, 05/12/2015, 07/14/2015, 09/08/2015, 09/08/2015   • FLU VACC High Dose 65 YRS & Older PRSV Free (51859) 10/28/2014   • FLUAD High Dose 72 yr and older (32010) 10/10/2017, 09 agrees to the plan. Reinforced healthy diet, lifestyle, and exercise. No follow-ups on file.      Hortencia Lim DO, 5/28/2019     General Health     In the past six months, have you lost more than 10 pounds without trying?: 2 - No  Has your appetit Pap+HPV every 5 yrs age 33-67, age 72 and older at high risk There are no preventive care reminders to display for this patient.  Update Health Maintenance if applicable    Chlamydia  Annually if high risk No results found for: CHLAMYDIA No flowsheet data f

## 2019-05-28 NOTE — PATIENT INSTRUCTIONS
Sia Cook's SCREENING SCHEDULE   Tests on this list are recommended by your physician but may not be covered, or covered at this frequency, by your insurer. Please check with your insurance carrier before scheduling to verify coverage.    PREVENTAT 73-68 years old and have smoked more than 100 cigarettes in their lifetime   • Anyone with a family history    Colorectal Cancer Screening  Covered up to Age 76     Colonoscopy Screen   Covered every 10 years- more often if abnormal There are no preventive Influenza  Covered Annually Orders placed or performed in visit on 11/16/16   • FLU VACC [address] ANTIG   Orders placed or performed in visit on 11/23/15   • FLU VACC [address] ANTIG   Orders placed or performed in visit on 10/28/14   • FLU V http://www. idph.state. [place]/public/books/advin.htm  A link to the [employer]. This site has a lot of good information including definitions of the different types of Advance Directives.  It also has the State forms available on it's webs

## 2019-05-29 DIAGNOSIS — E03.9 ACQUIRED HYPOTHYROIDISM: Primary | ICD-10-CM

## 2019-05-29 RX ORDER — LEVOTHYROXINE SODIUM 88 UG/1
88 TABLET ORAL
Qty: 90 TABLET | Refills: 0 | Status: SHIPPED | OUTPATIENT
Start: 2019-05-29 | End: 2019-08-27

## 2019-06-25 DIAGNOSIS — G20 PARKINSON'S DISEASE (HCC): ICD-10-CM

## 2019-06-25 NOTE — TELEPHONE ENCOUNTER
Spoke to the pharmacist who states the patient has refills on file.     Medication: carbidopa-levodopa  MG Oral Tab    Date of last refill: 02/13/19 (#90/6)  [place]    Last office visit: 2/13/2019  Due back to

## 2019-08-28 RX ORDER — LEVOTHYROXINE SODIUM 88 UG/1
88 TABLET ORAL
Qty: 90 TABLET | Refills: 0 | Status: SHIPPED | OUTPATIENT
Start: 2019-08-28 | End: 2019-09-13

## 2019-08-28 NOTE — TELEPHONE ENCOUNTER
Last office visit:  05/28/19  Last refill:  05/29/19   #0, no refills  Last tsh:    05/28/19  Sending [employer] message to schedule appointment for a tsh.

## 2019-09-11 ENCOUNTER — OFFICE VISIT (OUTPATIENT)
Dept: NEUROLOGY | Facility: CLINIC | Age: 84
End: 2019-09-11
Payer: MEDICARE

## 2019-09-11 VITALS
HEIGHT: 63 IN | RESPIRATION RATE: 15 BRPM | DIASTOLIC BLOOD PRESSURE: 58 MMHG | SYSTOLIC BLOOD PRESSURE: 106 MMHG | BODY MASS INDEX: 31.89 KG/M2 | HEART RATE: 78 BPM | WEIGHT: 180 LBS

## 2019-09-11 DIAGNOSIS — G20 PARKINSON'S DISEASE (HCC): Primary | ICD-10-CM

## 2019-09-11 PROCEDURE — 99213 OFFICE O/P EST LOW 20 MIN: CPT | Performed by: OTHER

## 2019-09-11 NOTE — PROGRESS NOTES
Patient states she is always sleepy, but does have some insomnia. Patient states tremors in right hand have stayed the same.

## 2019-09-11 NOTE — PATIENT INSTRUCTIONS
After your visit at the Pat Mayo office  today,  please direct any follow up questions or medication needs to the staff in our [place] office so that your concerns may be promptly addressed.   We are available through [employer] or at the numbers below: be picked up in office. • Please allow the office 2-3 business days to fill the prescription. • Patient must present photo ID at time of . PLEASE NOTE: PRESCRIPTIONS MUST BE PICKED UP PRIOR TO 3:00PM MONDAY-FRIDAY    Scheduling Tests:     If your submitting forms to office staff. • Form completion may require an additional fee. • A signed Release of Information (RACHAEL) must be on file before forms may be submitted. When dropping off forms, please ask the  for this paper.    • Failure

## 2019-09-11 NOTE — PROGRESS NOTES
Neurology H&P    [address] [place] [address] Patient Status:  No patient class for patient encounter    1930 MRN [id number]   Location ED [facility] Attending No att. providers found   Hosp Day # 0 PCP Alecia Marte DO     Subjective:  Initial Cli before breakfast. Disp: 90 tablet Rfl: 0   carbidopa-levodopa  MG Oral Tab Take 1 tablet by mouth 3 (three) times daily. Disp: 90 tablet Rfl: 6   simvastatin 20 MG Oral Tab Take 1 tablet (20 mg total) by mouth nightly.  Disp: 90 tablet Rfl: 2   aspiri Relation Age of Onset   • Eye Problems Mother         AMD   • Cancer Mother         Breast   • Heart Attack Father 67        MI   • Other (CAD) Father    • Cancer Brother         Brain tumor       ROS:  Gen: no unexplained weight loss  Vision: no vision ch bloc turning, not  a shuffling gait, mildly stooped posture    Romberg's: negative    Retropulsion neg. Labs:       Imaging:  MRI Brain 1/2018  =====  CONCLUSION:    1. No acute infarct or intracranial hemorrhage.   2. Mild chronic small vessel ischemi

## 2019-09-12 ENCOUNTER — APPOINTMENT (OUTPATIENT)
Dept: LAB | Age: 84
End: 2019-09-12
Attending: FAMILY MEDICINE
Payer: MEDICARE

## 2019-09-12 ENCOUNTER — OFFICE VISIT (OUTPATIENT)
Dept: FAMILY MEDICINE CLINIC | Facility: CLINIC | Age: 84
End: 2019-09-12
Payer: MEDICARE

## 2019-09-12 VITALS
DIASTOLIC BLOOD PRESSURE: 68 MMHG | RESPIRATION RATE: 16 BRPM | BODY MASS INDEX: 32 KG/M2 | WEIGHT: 179 LBS | TEMPERATURE: 98 F | SYSTOLIC BLOOD PRESSURE: 134 MMHG | HEART RATE: 76 BPM

## 2019-09-12 DIAGNOSIS — E03.9 ACQUIRED HYPOTHYROIDISM: ICD-10-CM

## 2019-09-12 DIAGNOSIS — E03.9 ACQUIRED HYPOTHYROIDISM: Primary | ICD-10-CM

## 2019-09-12 DIAGNOSIS — G20 PARKINSON'S DISEASE (HCC): ICD-10-CM

## 2019-09-12 DIAGNOSIS — E78.5 HYPERLIPIDEMIA, UNSPECIFIED HYPERLIPIDEMIA TYPE: ICD-10-CM

## 2019-09-12 LAB — TSI SER-ACNC: 0.44 MIU/ML (ref 0.36–3.74)

## 2019-09-12 PROCEDURE — 84443 ASSAY THYROID STIM HORMONE: CPT

## 2019-09-12 PROCEDURE — 99213 OFFICE O/P EST LOW 20 MIN: CPT | Performed by: FAMILY MEDICINE

## 2019-09-12 PROCEDURE — 36415 COLL VENOUS BLD VENIPUNCTURE: CPT

## 2019-09-12 RX ORDER — ACETAMINOPHEN 500 MG
500 TABLET ORAL EVERY 6 HOURS PRN
COMMUNITY
End: 2021-06-05 | Stop reason: ALTCHOICE

## 2019-09-12 NOTE — PROGRESS NOTES
Yair Sharp is a 80year old female. Patient presents with: Other: c/o low back pain hurts more with standing      HPI:   Twisted back when she tripped over a cord. Right low back sore. No radicular pain.     Current Outpatient Medications on File Pr Mother         Breast   • Heart Attack Father 67        MI   • Other (CAD) Father    • Cancer Brother         Brain tumor        Social History:  Social History    Tobacco Use      Smoking status: Former Smoker        Packs/day: 0.30        Years: 15.00 - 1.02 mg/dL Final   • BUN/CREA Ratio 05/28/2019 25.0* 10.0 - 20.0 Final   • Calcium, Total 05/28/2019 9.3  8.5 - 10.1 mg/dL Final   • Calculated Osmolality 05/28/2019 294  275 - 295 mOsm/kg Final   • GFR, Non- 05/28/2019 66  >=60 Final   • be on biotin supplementation to stop biotin consumption at least 72 hours prior to collection of a new sample.          ASSESSMENT AND PLAN:     Acquired hypothyroidism  (primary encounter diagnosis)  Parkinson's disease (hcc)  Hyperlipidemia, unspecified h

## 2019-09-13 RX ORDER — LEVOTHYROXINE SODIUM 88 UG/1
88 TABLET ORAL
Qty: 90 TABLET | Refills: 3 | Status: SHIPPED | OUTPATIENT
Start: 2019-09-13 | End: 2020-08-17

## 2019-10-11 ENCOUNTER — IMMUNIZATION (OUTPATIENT)
Dept: FAMILY MEDICINE CLINIC | Facility: CLINIC | Age: 84
End: 2019-10-11
Payer: MEDICARE

## 2019-10-11 DIAGNOSIS — Z23 NEED FOR VACCINATION: ICD-10-CM

## 2019-10-11 PROCEDURE — 90662 IIV NO PRSV INCREASED AG IM: CPT | Performed by: FAMILY MEDICINE

## 2019-10-11 PROCEDURE — G0008 ADMIN INFLUENZA VIRUS VAC: HCPCS | Performed by: FAMILY MEDICINE

## 2019-12-03 DIAGNOSIS — E78.5 HYPERLIPIDEMIA, UNSPECIFIED HYPERLIPIDEMIA TYPE: ICD-10-CM

## 2019-12-03 RX ORDER — SIMVASTATIN 20 MG
TABLET ORAL
Qty: 90 TABLET | Refills: 0 | Status: SHIPPED | OUTPATIENT
Start: 2019-12-03 | End: 2020-02-15

## 2019-12-03 NOTE — TELEPHONE ENCOUNTER
Last office visit:  09/12/19  Last refill:  10/25/18  #90 2 refills  Last lipid:  05/28/19      No future appointments.

## 2019-12-04 RX ORDER — SIMVASTATIN 20 MG
20 TABLET ORAL
Qty: 90 TABLET | Refills: 0 | OUTPATIENT
Start: 2019-12-04

## 2020-01-06 ENCOUNTER — TELEPHONE (OUTPATIENT)
Dept: FAMILY MEDICINE CLINIC | Facility: CLINIC | Age: 85
End: 2020-01-06

## 2020-01-06 NOTE — TELEPHONE ENCOUNTER
Please put her in with Sidra today.   It is okay to put any of my patients in with Sidra if they need to be seen today

## 2020-01-06 NOTE — TELEPHONE ENCOUNTER
Patient had sent a [employer] message 1/3/19 and I advised that she will need to be seen. Wayne Moyer for her to see Mike Clark? [employer] Message:    I have th is recurring nagging pain in m y right side of my abdomen.   It comes and go es.    I also have a white head

## 2020-01-07 ENCOUNTER — OFFICE VISIT (OUTPATIENT)
Dept: FAMILY MEDICINE CLINIC | Facility: CLINIC | Age: 85
End: 2020-01-07
Payer: MEDICARE

## 2020-01-07 VITALS
TEMPERATURE: 98 F | DIASTOLIC BLOOD PRESSURE: 70 MMHG | WEIGHT: 172.5 LBS | HEART RATE: 78 BPM | BODY MASS INDEX: 31 KG/M2 | OXYGEN SATURATION: 93 % | SYSTOLIC BLOOD PRESSURE: 120 MMHG

## 2020-01-07 DIAGNOSIS — K64.4 ANAL SKIN TAG: Primary | ICD-10-CM

## 2020-01-07 DIAGNOSIS — R10.31 RIGHT LOWER QUADRANT ABDOMINAL PAIN: ICD-10-CM

## 2020-01-07 DIAGNOSIS — G20 PARKINSON'S DISEASE (HCC): ICD-10-CM

## 2020-01-07 PROCEDURE — 99214 OFFICE O/P EST MOD 30 MIN: CPT | Performed by: FAMILY MEDICINE

## 2020-01-07 NOTE — PROGRESS NOTES
Teddy Barrera is a 80year old female. Patient presents with:  Cyst: cyst on rectum-has had this for a couple wks-no pain, no bleeding or discharge. ...room 2  Abdominal Pain: has been having mid to RT side abdominal pain x 6 months      HPI:   Patient Laterality Date   • CHOLECYSTECTOMY      gallbladder   • COLONOSCOPY      3/2008 diverticulosis mayo   • TONSILLECTOMY       Family History   Problem Relation Age of Onset   • Eye Problems Mother         AMD   • Cancer Mother         Breast   • Heart Bentley Bess she try Benefiber daily. If the pain keeps recurring or if it persists she is going to need a repeat CT scan of the abdomen and pelvis. No orders of the defined types were placed in this encounter.       Meds & Refills for this Visit:  Yuliana Perez

## 2020-01-10 ENCOUNTER — OFFICE VISIT (OUTPATIENT)
Dept: SURGERY | Facility: CLINIC | Age: 85
End: 2020-01-10
Payer: MEDICARE

## 2020-01-10 VITALS — BODY MASS INDEX: 30.48 KG/M2 | HEIGHT: 63 IN | TEMPERATURE: 98 F | WEIGHT: 172 LBS

## 2020-01-10 DIAGNOSIS — K62.9 ANAL LESION: Primary | ICD-10-CM

## 2020-01-10 PROCEDURE — 99204 OFFICE O/P NEW MOD 45 MIN: CPT | Performed by: SURGERY

## 2020-01-10 PROCEDURE — 88305 TISSUE EXAM BY PATHOLOGIST: CPT | Performed by: SURGERY

## 2020-01-10 NOTE — H&P
Remy Lisandro is a 80year old female  Patient presents with:  Anal / Rectal Problem: New patient referred by Dr. Aakash Guzman for anal lump. Denies any drainage or pain.        REFERRED BY    Patient presents with  Perianal mass   No pain no drainage   Waqar Fox History   Problem Relation Age of Onset   • Eye Problems Mother         AMD   • Cancer Mother         Breast   • Heart Attack Father 67        MI   • Other (CAD) Father    • Cancer Brother         Brain tumor     Social History    Tobacco Use      Smoking hernias  LYMPHATIC: no axillary , supraclavicular or inguinal lymphadenopathy  EXTREMITIES: no cyanosis, clubbing or edema, no atrophy, full ROM  Rectal: Positive submillimeter anal mass raised friable.   STUDIES:     Appointment on 09/12/2019   Component D

## 2020-01-11 ENCOUNTER — TELEPHONE (OUTPATIENT)
Dept: FAMILY MEDICINE CLINIC | Facility: CLINIC | Age: 85
End: 2020-01-11

## 2020-01-11 NOTE — TELEPHONE ENCOUNTER
Pt had a procedure yesterday with Alli Nathan and she does not know how to care for the incision.  She cannot get a hold of anybody in [place]

## 2020-01-13 ENCOUNTER — TELEPHONE (OUTPATIENT)
Dept: SURGERY | Facility: CLINIC | Age: 85
End: 2020-01-13

## 2020-01-13 NOTE — TELEPHONE ENCOUNTER
Reviewed with patient to cleanse area with soap and water, pat dry and she can use a peripad to catch any drainage.  Pt V/U.      ----- Message from Mann Salazar sent at 1/11/2020  9:30 AM CST -----  Regarding: Visit Follow-up Question  Contact: [phone]

## 2020-01-22 ENCOUNTER — TELEPHONE (OUTPATIENT)
Dept: SURGERY | Facility: CLINIC | Age: 85
End: 2020-01-22

## 2020-01-22 DIAGNOSIS — K62.9 ANAL LESION: Primary | ICD-10-CM

## 2020-01-22 RX ORDER — ACETAMINOPHEN 500 MG
1000 TABLET ORAL ONCE
Status: CANCELLED | OUTPATIENT
Start: 2020-01-22 | End: 2020-01-22

## 2020-01-22 RX ORDER — DOCUSATE SODIUM 100 MG/1
100 CAPSULE, LIQUID FILLED ORAL NIGHTLY
COMMUNITY
End: 2021-06-05 | Stop reason: ALTCHOICE

## 2020-01-22 NOTE — TELEPHONE ENCOUNTER
Called patient. Scheduled patient for excision anal mass on 2/3/2020 at [facility] per Dr. Trina Arshad. Reviewed pre op surgery checklist with patient. Advised patient she will need medical clearance from Dr. Adela Stoner. Patient voiced understanding.    [place]

## 2020-01-22 NOTE — TELEPHONE ENCOUNTER
Spoke with son, Adilia Contreras. He is on FYI, questions regarding procedure and clearances. All questions answered and son V/U and no further questions. Would like PAT to call him as well as pt with the time of arrival for procedure. Jordan Ellison - [phone].

## 2020-01-23 ENCOUNTER — LAB ENCOUNTER (OUTPATIENT)
Dept: LAB | Age: 85
End: 2020-01-23
Attending: FAMILY MEDICINE
Payer: MEDICARE

## 2020-01-23 ENCOUNTER — OFFICE VISIT (OUTPATIENT)
Dept: FAMILY MEDICINE CLINIC | Facility: CLINIC | Age: 85
End: 2020-01-23
Payer: MEDICARE

## 2020-01-23 VITALS
SYSTOLIC BLOOD PRESSURE: 120 MMHG | BODY MASS INDEX: 31.28 KG/M2 | DIASTOLIC BLOOD PRESSURE: 60 MMHG | HEIGHT: 62 IN | TEMPERATURE: 98 F | OXYGEN SATURATION: 95 % | HEART RATE: 86 BPM | WEIGHT: 170 LBS | RESPIRATION RATE: 16 BRPM

## 2020-01-23 DIAGNOSIS — Z01.810 PRE-OPERATIVE CARDIOVASCULAR EXAMINATION: ICD-10-CM

## 2020-01-23 DIAGNOSIS — H35.3290 EXUDATIVE AGE-RELATED MACULAR DEGENERATION, UNSPECIFIED LATERALITY, UNSPECIFIED STAGE (HCC): ICD-10-CM

## 2020-01-23 DIAGNOSIS — R82.90 ABNORMAL URINE FINDING: ICD-10-CM

## 2020-01-23 DIAGNOSIS — K64.4 ANAL SKIN TAG: ICD-10-CM

## 2020-01-23 DIAGNOSIS — Z01.810 PRE-OPERATIVE CARDIOVASCULAR EXAMINATION: Primary | ICD-10-CM

## 2020-01-23 DIAGNOSIS — E78.5 HYPERLIPIDEMIA, UNSPECIFIED HYPERLIPIDEMIA TYPE: ICD-10-CM

## 2020-01-23 DIAGNOSIS — E03.9 HYPOTHYROIDISM, UNSPECIFIED TYPE: ICD-10-CM

## 2020-01-23 DIAGNOSIS — G20 PARKINSON'S DISEASE (HCC): ICD-10-CM

## 2020-01-23 LAB
ALBUMIN SERPL-MCNC: 3.6 G/DL (ref 3.4–5)
ALBUMIN/GLOB SERPL: 1 {RATIO} (ref 1–2)
ALP LIVER SERPL-CCNC: 46 U/L (ref 55–142)
ALT SERPL-CCNC: 13 U/L (ref 13–56)
ANION GAP SERPL CALC-SCNC: 5 MMOL/L (ref 0–18)
AST SERPL-CCNC: 11 U/L (ref 15–37)
BASOPHILS # BLD AUTO: 0.04 X10(3) UL (ref 0–0.2)
BASOPHILS NFR BLD AUTO: 0.7 %
BILIRUB SERPL-MCNC: 0.3 MG/DL (ref 0.1–2)
BILIRUBIN: NEGATIVE
BUN BLD-MCNC: 18 MG/DL (ref 7–18)
BUN/CREAT SERPL: 20 (ref 10–20)
CALCIUM BLD-MCNC: 9 MG/DL (ref 8.5–10.1)
CHLORIDE SERPL-SCNC: 109 MMOL/L (ref 98–112)
CHOLEST SMN-MCNC: 174 MG/DL (ref ?–200)
CO2 SERPL-SCNC: 29 MMOL/L (ref 21–32)
CREAT BLD-MCNC: 0.9 MG/DL (ref 0.55–1.02)
DEPRECATED RDW RBC AUTO: 44.6 FL (ref 35.1–46.3)
EOSINOPHIL # BLD AUTO: 0.09 X10(3) UL (ref 0–0.7)
EOSINOPHIL NFR BLD AUTO: 1.5 %
ERYTHROCYTE [DISTWIDTH] IN BLOOD BY AUTOMATED COUNT: 12.5 % (ref 11–15)
GLOBULIN PLAS-MCNC: 3.5 G/DL (ref 2.8–4.4)
GLUCOSE (URINE DIPSTICK): NEGATIVE MG/DL
GLUCOSE BLD-MCNC: 125 MG/DL (ref 70–99)
HCT VFR BLD AUTO: 44.5 % (ref 35–48)
HDLC SERPL-MCNC: 63 MG/DL (ref 40–59)
HGB BLD-MCNC: 14 G/DL (ref 12–16)
IMM GRANULOCYTES # BLD AUTO: 0.01 X10(3) UL (ref 0–1)
IMM GRANULOCYTES NFR BLD: 0.2 %
KETONES (URINE DIPSTICK): NEGATIVE MG/DL
LDLC SERPL CALC-MCNC: 84 MG/DL (ref ?–100)
LYMPHOCYTES # BLD AUTO: 0.9 X10(3) UL (ref 1–4)
LYMPHOCYTES NFR BLD AUTO: 15.4 %
M PROTEIN MFR SERPL ELPH: 7.1 G/DL (ref 6.4–8.2)
MCH RBC QN AUTO: 30.8 PG (ref 26–34)
MCHC RBC AUTO-ENTMCNC: 31.5 G/DL (ref 31–37)
MCV RBC AUTO: 97.8 FL (ref 80–100)
MONOCYTES # BLD AUTO: 0.5 X10(3) UL (ref 0.1–1)
MONOCYTES NFR BLD AUTO: 8.6 %
MULTISTIX LOT#: ABNORMAL NUMERIC
NEUTROPHILS # BLD AUTO: 4.3 X10 (3) UL (ref 1.5–7.7)
NEUTROPHILS # BLD AUTO: 4.3 X10(3) UL (ref 1.5–7.7)
NEUTROPHILS NFR BLD AUTO: 73.6 %
NITRITE, URINE: NEGATIVE
NONHDLC SERPL-MCNC: 111 MG/DL (ref ?–130)
OSMOLALITY SERPL CALC.SUM OF ELEC: 299 MOSM/KG (ref 275–295)
PH, URINE: 6 (ref 4.5–8)
PLATELET # BLD AUTO: 233 10(3)UL (ref 150–450)
POTASSIUM SERPL-SCNC: 4.2 MMOL/L (ref 3.5–5.1)
RBC # BLD AUTO: 4.55 X10(6)UL (ref 3.8–5.3)
SODIUM SERPL-SCNC: 143 MMOL/L (ref 136–145)
SPECIFIC GRAVITY: 1.02 (ref 1–1.03)
TRIGL SERPL-MCNC: 136 MG/DL (ref 30–149)
TSI SER-ACNC: 0.42 MIU/ML (ref 0.36–3.74)
URINE-COLOR: YELLOW
UROBILINOGEN,SEMI-QN: 0.2 MG/DL (ref 0–1.9)
VLDLC SERPL CALC-MCNC: 27 MG/DL (ref 0–30)
WBC # BLD AUTO: 5.8 X10(3) UL (ref 4–11)

## 2020-01-23 PROCEDURE — 93000 ELECTROCARDIOGRAM COMPLETE: CPT | Performed by: FAMILY MEDICINE

## 2020-01-23 PROCEDURE — 87086 URINE CULTURE/COLONY COUNT: CPT | Performed by: FAMILY MEDICINE

## 2020-01-23 PROCEDURE — 85025 COMPLETE CBC W/AUTO DIFF WBC: CPT

## 2020-01-23 PROCEDURE — 81003 URINALYSIS AUTO W/O SCOPE: CPT | Performed by: FAMILY MEDICINE

## 2020-01-23 PROCEDURE — 80061 LIPID PANEL: CPT

## 2020-01-23 PROCEDURE — 99214 OFFICE O/P EST MOD 30 MIN: CPT | Performed by: FAMILY MEDICINE

## 2020-01-23 PROCEDURE — 36415 COLL VENOUS BLD VENIPUNCTURE: CPT

## 2020-01-23 PROCEDURE — 84443 ASSAY THYROID STIM HORMONE: CPT

## 2020-01-23 PROCEDURE — 80053 COMPREHEN METABOLIC PANEL: CPT

## 2020-01-23 NOTE — PROGRESS NOTES
Stefani Ulloa is a 80year old female.   Patient presents with:  Pre-Op Exam: pre op for Dr. Ray Trujillo for excision of anal mass on 02/03/2020-haven't heard back from [facility] yet on what orders need to be completed--pt has had anesthesia before with no uterine prolaspse   • Visual impairment     glasses   • Vitamin D deficiency 7/26/2012     Past Surgical History:   Procedure Laterality Date   • CHOLECYSTECTOMY      gallbladder   • COLONOSCOPY      3/2008 diverticulosis mayo   • TONSILLECTOMY       Fami Diagnosis Comment    Sections demonstrate an atypical squamous proliferation. The lesion is broadly transected at the biopsy base. Complete excision is recommended for diagnostic and management purposes.          ASSESSMENT AND PLAN:     Pre-operative car

## 2020-01-27 ENCOUNTER — TELEPHONE (OUTPATIENT)
Dept: NEUROLOGY | Facility: CLINIC | Age: 85
End: 2020-01-27

## 2020-01-27 NOTE — TELEPHONE ENCOUNTER
Patient states she is scheduled to have surgery under anesthesia on Monday 2/3 and her daughter read something about contraindication/risk of taking Carbidopa/Levadopa and anesthesia.

## 2020-01-27 NOTE — TELEPHONE ENCOUNTER
She needs to make anesthesia is aware of all of her medications. If Sinemet needs to be held or tapered perioperatively that is ok. There is a low risk of interactions with certain anesthetic medications and Sinemet.  This can exacerbate symptoms perioperat

## 2020-01-27 NOTE — TELEPHONE ENCOUNTER
Spoke with pt, relayed Dr. Carver Hands note below.  Advised pt to call surgeon to inform them of concerns regarding Sinemet, advised pt to ensure Anesthesia is aware, and to re request any recommendations Anesthesia may have, requested pt call office back to

## 2020-01-28 ENCOUNTER — TELEPHONE (OUTPATIENT)
Dept: SURGERY | Facility: CLINIC | Age: 85
End: 2020-01-28

## 2020-02-03 ENCOUNTER — ANESTHESIA EVENT (OUTPATIENT)
Dept: SURGERY | Facility: HOSPITAL | Age: 85
End: 2020-02-03
Payer: MEDICARE

## 2020-02-03 ENCOUNTER — HOSPITAL ENCOUNTER (OUTPATIENT)
Facility: HOSPITAL | Age: 85
Setting detail: HOSPITAL OUTPATIENT SURGERY
Discharge: HOME | End: 2020-02-03
Attending: SURGERY | Admitting: SURGERY
Payer: MEDICARE

## 2020-02-03 ENCOUNTER — ANESTHESIA (OUTPATIENT)
Dept: SURGERY | Facility: HOSPITAL | Age: 85
End: 2020-02-03
Payer: MEDICARE

## 2020-02-03 VITALS
OXYGEN SATURATION: 97 % | RESPIRATION RATE: 18 BRPM | BODY MASS INDEX: 31.16 KG/M2 | SYSTOLIC BLOOD PRESSURE: 160 MMHG | HEART RATE: 78 BPM | WEIGHT: 169.31 LBS | HEIGHT: 62 IN | DIASTOLIC BLOOD PRESSURE: 60 MMHG | TEMPERATURE: 97 F

## 2020-02-03 DIAGNOSIS — K62.9 ANAL LESION: ICD-10-CM

## 2020-02-03 PROCEDURE — 88305 TISSUE EXAM BY PATHOLOGIST: CPT | Performed by: SURGERY

## 2020-02-03 PROCEDURE — 0DBQXZX EXCISION OF ANUS, EXTERNAL APPROACH, DIAGNOSTIC: ICD-10-PCS | Performed by: SURGERY

## 2020-02-03 PROCEDURE — 88342 IMHCHEM/IMCYTCHM 1ST ANTB: CPT | Performed by: SURGERY

## 2020-02-03 PROCEDURE — 88365 INSITU HYBRIDIZATION (FISH): CPT | Performed by: SURGERY

## 2020-02-03 RX ORDER — BUPIVACAINE HYDROCHLORIDE AND EPINEPHRINE 5; 5 MG/ML; UG/ML
INJECTION, SOLUTION EPIDURAL; INTRACAUDAL; PERINEURAL AS NEEDED
Status: DISCONTINUED | OUTPATIENT
Start: 2020-02-03 | End: 2020-02-03 | Stop reason: HOSPADM

## 2020-02-03 RX ORDER — SODIUM CHLORIDE, SODIUM LACTATE, POTASSIUM CHLORIDE, CALCIUM CHLORIDE 600; 310; 30; 20 MG/100ML; MG/100ML; MG/100ML; MG/100ML
INJECTION, SOLUTION INTRAVENOUS CONTINUOUS
Status: DISCONTINUED | OUTPATIENT
Start: 2020-02-03 | End: 2020-02-03

## 2020-02-03 RX ORDER — HYDROCODONE BITARTRATE AND ACETAMINOPHEN 5; 325 MG/1; MG/1
1-2 TABLET ORAL EVERY 4 HOURS PRN
Qty: 30 TABLET | Refills: 0 | Status: SHIPPED | OUTPATIENT
Start: 2020-02-03 | End: 2020-02-11

## 2020-02-03 RX ORDER — HYDROCODONE BITARTRATE AND ACETAMINOPHEN 5; 325 MG/1; MG/1
1 TABLET ORAL AS NEEDED
Status: DISCONTINUED | OUTPATIENT
Start: 2020-02-03 | End: 2020-02-03

## 2020-02-03 RX ORDER — HEPARIN SODIUM 5000 [USP'U]/ML
INJECTION, SOLUTION INTRAVENOUS; SUBCUTANEOUS
Status: COMPLETED
Start: 2020-02-03 | End: 2020-02-03

## 2020-02-03 RX ORDER — HYDROMORPHONE HYDROCHLORIDE 1 MG/ML
0.4 INJECTION, SOLUTION INTRAMUSCULAR; INTRAVENOUS; SUBCUTANEOUS EVERY 5 MIN PRN
Status: DISCONTINUED | OUTPATIENT
Start: 2020-02-03 | End: 2020-02-03

## 2020-02-03 RX ORDER — NALOXONE HYDROCHLORIDE 0.4 MG/ML
80 INJECTION, SOLUTION INTRAMUSCULAR; INTRAVENOUS; SUBCUTANEOUS AS NEEDED
Status: DISCONTINUED | OUTPATIENT
Start: 2020-02-03 | End: 2020-02-03

## 2020-02-03 RX ORDER — LIDOCAINE HYDROCHLORIDE 10 MG/ML
INJECTION, SOLUTION EPIDURAL; INFILTRATION; INTRACAUDAL; PERINEURAL AS NEEDED
Status: DISCONTINUED | OUTPATIENT
Start: 2020-02-03 | End: 2020-02-03 | Stop reason: SURG

## 2020-02-03 RX ORDER — DEXAMETHASONE SODIUM PHOSPHATE 4 MG/ML
VIAL (ML) INJECTION AS NEEDED
Status: DISCONTINUED | OUTPATIENT
Start: 2020-02-03 | End: 2020-02-03 | Stop reason: SURG

## 2020-02-03 RX ORDER — ONDANSETRON 2 MG/ML
INJECTION INTRAMUSCULAR; INTRAVENOUS AS NEEDED
Status: DISCONTINUED | OUTPATIENT
Start: 2020-02-03 | End: 2020-02-03 | Stop reason: SURG

## 2020-02-03 RX ORDER — ROCURONIUM BROMIDE 10 MG/ML
INJECTION, SOLUTION INTRAVENOUS AS NEEDED
Status: DISCONTINUED | OUTPATIENT
Start: 2020-02-03 | End: 2020-02-03 | Stop reason: SURG

## 2020-02-03 RX ORDER — HYDROCODONE BITARTRATE AND ACETAMINOPHEN 5; 325 MG/1; MG/1
2 TABLET ORAL AS NEEDED
Status: DISCONTINUED | OUTPATIENT
Start: 2020-02-03 | End: 2020-02-03

## 2020-02-03 RX ORDER — HEPARIN SODIUM 5000 [USP'U]/ML
5000 INJECTION, SOLUTION INTRAVENOUS; SUBCUTANEOUS ONCE
Status: COMPLETED | OUTPATIENT
Start: 2020-02-03 | End: 2020-02-03

## 2020-02-03 RX ORDER — DIBUCAINE 0.28 G/28G
1 OINTMENT TOPICAL AS NEEDED
Qty: 1 TUBE | Refills: 0 | Status: SHIPPED | OUTPATIENT
Start: 2020-02-03 | End: 2020-07-20 | Stop reason: ALTCHOICE

## 2020-02-03 RX ORDER — ONDANSETRON 2 MG/ML
4 INJECTION INTRAMUSCULAR; INTRAVENOUS AS NEEDED
Status: DISCONTINUED | OUTPATIENT
Start: 2020-02-03 | End: 2020-02-03

## 2020-02-03 RX ORDER — DIBUCAINE 0.28 G/28G
1 OINTMENT TOPICAL AS NEEDED
Qty: 1 TUBE | Refills: 0 | Status: SHIPPED | OUTPATIENT
Start: 2020-02-03 | End: 2020-02-03

## 2020-02-03 RX ADMIN — DEXAMETHASONE SODIUM PHOSPHATE 4 MG: 4 MG/ML VIAL (ML) INJECTION at 15:52:00

## 2020-02-03 RX ADMIN — ONDANSETRON 4 MG: 2 INJECTION INTRAMUSCULAR; INTRAVENOUS at 15:52:00

## 2020-02-03 RX ADMIN — LIDOCAINE HYDROCHLORIDE 50 MG: 10 INJECTION, SOLUTION EPIDURAL; INFILTRATION; INTRACAUDAL; PERINEURAL at 15:41:00

## 2020-02-03 RX ADMIN — ROCURONIUM BROMIDE 50 MG: 10 INJECTION, SOLUTION INTRAVENOUS at 15:41:00

## 2020-02-03 NOTE — ANESTHESIA POSTPROCEDURE EVALUATION
[address] [facility] Patient Status:  Hospital Outpatient Surgery   Age/Gender 80year old female MRN [id number]   [facility] SURGERY Attending Eileen Stallings, [address] [facility] Day # 0 PCP Califon DO Angelica       Anesthesia Post-op No

## 2020-02-03 NOTE — H&P
Patient presents with  Perianal mass   No pain no drainage   Takes nothing for it   Patient is unsure how long the mass has been there.   She states it is possibly been a few months she states it is slowly increasing in size it occasionally bleeds it is not Other (CAD) Father     • Cancer Brother           Brain tumor      Social History    Tobacco Use      Smoking status: Former Smoker        Packs/day: 0.30        Years: 15.00        Pack years: 4.5        Types: Cigarettes        Quit date: 1/1/1965 ROM  Rectal: Positive submillimeter anal mass raised friable.   STUDIES:              Appointment on 09/12/2019   Component Date Value Ref Range Status   • TSH 09/12/2019 0.440  0.358 - 3.740 mIU/mL Final        This test may exhibit interference when a jono

## 2020-02-03 NOTE — ANESTHESIA PREPROCEDURE EVALUATION
PRE-OP EVALUATION    Patient Name: Colon Ek    Pre-op Diagnosis: Anal lesion [K62.9]    Procedure(s):  EXCISION ANAL MASS    Surgeon(s) and Role:     Ingrid Mackay, DO - Primary    Pre-op vitals reviewed. Body mass index is 31.09 kg/m². mayo   • TONSILLECTOMY       Social History    Tobacco Use      Smoking status: Former Smoker        Packs/day: 0.30        Years: 15.00        Pack years: 4.5        Types: Cigarettes        Quit date: 1965        Years since quittin.1      Smok

## 2020-02-03 NOTE — ANESTHESIA PROCEDURE NOTES
Airway  Urgency: elective      General Information and Staff    Patient location during procedure: OR  Anesthesiologist: Tyrone Christianson MD  Resident/CRNA: Yadira Ryan CRNA  Performed: CRNA     Indications and Patient Condition  Indications for airway manage

## 2020-02-03 NOTE — ANESTHESIA PROCEDURE NOTES
Peripheral IV  Inserted by: Ayanna Petersen CRNA    Placement  Needle size: 20 G  Laterality: right  Location: hand  Site prep: alcohol  Attempts: 1

## 2020-02-04 NOTE — OPERATIVE REPORT
[address] [place]    PATIENT'S NAME: Matildemark Welsh   ATTENDING PHYSICIAN: Aruna Snowden D.O.   OPERATING PHYSICIAN: Aruna Snowden D.O.   PATIENT ACCOUNT#:   [de-identified]    LOCATION:  [address] [address] #:   [id number]       DA

## 2020-02-07 ENCOUNTER — MED REC SCAN ONLY (OUTPATIENT)
Dept: SURGERY | Facility: CLINIC | Age: 85
End: 2020-02-07

## 2020-02-11 ENCOUNTER — OFFICE VISIT (OUTPATIENT)
Dept: SURGERY | Facility: CLINIC | Age: 85
End: 2020-02-11

## 2020-02-11 VITALS — HEART RATE: 77 BPM | TEMPERATURE: 98 F | HEIGHT: 62 IN | BODY MASS INDEX: 31.1 KG/M2 | WEIGHT: 169 LBS

## 2020-02-11 DIAGNOSIS — C44.520 PRIMARY SQUAMOUS CELL CARCINOMA OF ANAL CANAL: Primary | ICD-10-CM

## 2020-02-11 PROCEDURE — 99024 POSTOP FOLLOW-UP VISIT: CPT | Performed by: SURGERY

## 2020-02-12 NOTE — PROGRESS NOTES
[facility]  Progress Note    Samira Cook Patient Status:  No patient class for patient encounter    1930 MRN [id number]   Location [facility], [address] Attending No att. providers found   Hosp Day # 0 PCP Muna Ferguson AM

## 2020-02-13 ENCOUNTER — TELEPHONE (OUTPATIENT)
Dept: SURGERY | Facility: CLINIC | Age: 85
End: 2020-02-13

## 2020-02-13 ENCOUNTER — TELEPHONE (OUTPATIENT)
Dept: FAMILY MEDICINE CLINIC | Facility: CLINIC | Age: 85
End: 2020-02-13

## 2020-02-13 NOTE — TELEPHONE ENCOUNTER
REF TO ONCOLOGISTS IN [facility] BY DR CHANEY'S OFFICE.   SON ELIZABETH WANTS TO CHECK WITH PCP ABOUT SOMEONE CLOSER SINCE SHE LIVES IN [facility]   PLEASE ADVISE

## 2020-02-13 NOTE — TELEPHONE ENCOUNTER
Benji Ojeda advised and verbalized understanding of the information provided    Number provided for Dr. Adrienne Barrientos

## 2020-02-13 NOTE — TELEPHONE ENCOUNTER
Pt's son called regarding recommendation to see Dr. Christal Fox and how soon they need to be seen. Spoke with Dr. Trina Arshad and pt needs to be seen within the next 3 weeks.   This information was given to pt's son who verbalized understanding but stated things such a

## 2020-02-14 ENCOUNTER — TELEPHONE (OUTPATIENT)
Dept: FAMILY MEDICINE CLINIC | Facility: CLINIC | Age: 85
End: 2020-02-14

## 2020-02-14 NOTE — TELEPHONE ENCOUNTER
Facesheet, Progress note, Labs, and Pathology faxed to Dr. Adrienne Barrientos for upcoming appt. Ok per Dr. Sivlia Bennett.

## 2020-02-15 DIAGNOSIS — E78.5 HYPERLIPIDEMIA, UNSPECIFIED HYPERLIPIDEMIA TYPE: ICD-10-CM

## 2020-02-15 RX ORDER — SIMVASTATIN 20 MG
20 TABLET ORAL NIGHTLY
Qty: 90 TABLET | Refills: 0 | Status: SHIPPED | OUTPATIENT
Start: 2020-02-15 | End: 2020-03-30

## 2020-02-15 NOTE — TELEPHONE ENCOUNTER
LOV: 1/23/20    LAST LAB: 1/23/20    LAST RX: 12/3/19, 90 tabs, 0 refills    Next OV:   Future Appointments   Date Time Provider Corona Olivo   3/18/2020 10:40 AM DO PERNELL Storm       PROTOCOL    Cholesterol Medicatio

## 2020-02-20 ENCOUNTER — TELEPHONE (OUTPATIENT)
Dept: FAMILY MEDICINE CLINIC | Facility: CLINIC | Age: 85
End: 2020-02-20

## 2020-02-20 ENCOUNTER — MED REC SCAN ONLY (OUTPATIENT)
Dept: FAMILY MEDICINE CLINIC | Facility: CLINIC | Age: 85
End: 2020-02-20

## 2020-03-10 ENCOUNTER — PATIENT MESSAGE (OUTPATIENT)
Dept: FAMILY MEDICINE CLINIC | Facility: CLINIC | Age: 85
End: 2020-03-10

## 2020-03-10 NOTE — TELEPHONE ENCOUNTER
From: Santi Cook  To: Jordan Berrios DO  Sent: 3/10/2020 3:48 PM CDT  Subject: Other    Knowing that I have macular degeneration, Parkinson's, Carcinoma and I am 80years old. .  I have an appointment Friday with Dr. Gurpreet Singh for an eye injection a

## 2020-03-12 ENCOUNTER — PATIENT MESSAGE (OUTPATIENT)
Dept: FAMILY MEDICINE CLINIC | Facility: CLINIC | Age: 85
End: 2020-03-12

## 2020-03-13 NOTE — TELEPHONE ENCOUNTER
From: Pat Cook  To: Vicky Warner DO  Sent: 3/12/2020 5:14 PM CDT  Subject: Non-Urgent Medical Question    I think it said I should make an appointment. .. If you want me to come over there, I'll have to find someone to bring me.   Just let me

## 2020-03-13 NOTE — TELEPHONE ENCOUNTER
Spoke with pt, she did cancel her other appointments, but I told her of the symptoms to keep an eye on and that if she did not have any of the symptoms she should be fine to go to the dentist and eye doctor.  She had some other questions about Covid-19 and

## 2020-03-30 DIAGNOSIS — E78.5 HYPERLIPIDEMIA, UNSPECIFIED HYPERLIPIDEMIA TYPE: ICD-10-CM

## 2020-03-30 RX ORDER — SIMVASTATIN 20 MG
20 TABLET ORAL NIGHTLY
Qty: 90 TABLET | Refills: 0 | Status: SHIPPED | OUTPATIENT
Start: 2020-03-30 | End: 2020-08-17

## 2020-03-30 NOTE — TELEPHONE ENCOUNTER
Last refill: 02/15/20  Qty: 90  W/ 0 refills  Last ov: 01/23/20    Requested Prescriptions     Pending Prescriptions Disp Refills   • simvastatin 20 MG Oral Tab 90 tablet 0     Sig: Take 1 tablet (20 mg total) by mouth nightly. No future appointments.

## 2020-05-13 ENCOUNTER — TELEPHONE (OUTPATIENT)
Dept: FAMILY MEDICINE CLINIC | Facility: CLINIC | Age: 85
End: 2020-05-13

## 2020-05-13 NOTE — TELEPHONE ENCOUNTER
Pt advised that we sent  #90 sent to [facility] on 3/30/20----SHE Lesterashkanstacie 79 6/30/20. Pt states her bottle has a date on 2/19.   Also states Carissa Pollack already called her and told her there is a refill ready for her to  (must be the 3/20

## 2020-05-13 NOTE — TELEPHONE ENCOUNTER
Simvastatin plano [facility]. Pt was seen in Jan for a pre-op and reviewed her med's with Angela goldstein.

## 2020-05-23 DIAGNOSIS — G20 PARKINSON'S DISEASE (HCC): ICD-10-CM

## 2020-05-26 NOTE — TELEPHONE ENCOUNTER
Medication: CARBIDOPA-LEVODOPA  MG    Date of last refill: 9/11/19 (#270/1)  [place]     Last office visit: 9/11/19  Due back to clinic per last office note:  6 months  Date next office visit scheduled:    Future A

## 2020-06-02 ENCOUNTER — VIRTUAL PHONE E/M (OUTPATIENT)
Dept: NEUROLOGY | Facility: CLINIC | Age: 85
End: 2020-06-02
Payer: MEDICARE

## 2020-06-02 DIAGNOSIS — G20 PARKINSON'S DISEASE (HCC): Primary | ICD-10-CM

## 2020-06-02 PROCEDURE — 99441 PHONE E/M BY PHYS 5-10 MIN: CPT | Performed by: OTHER

## 2020-06-02 NOTE — PROGRESS NOTES
Please note that the following visit was completed using two-way, real-time interactive audio and/or video communication.   This has been done in good silvina to provide continuity of care in the best interest of the provider-patient relationship, due to the

## 2020-07-20 ENCOUNTER — OFFICE VISIT (OUTPATIENT)
Dept: FAMILY MEDICINE CLINIC | Facility: CLINIC | Age: 85
End: 2020-07-20
Payer: MEDICARE

## 2020-07-20 VITALS
HEIGHT: 62 IN | OXYGEN SATURATION: 96 % | SYSTOLIC BLOOD PRESSURE: 124 MMHG | TEMPERATURE: 98 F | DIASTOLIC BLOOD PRESSURE: 70 MMHG | WEIGHT: 165.25 LBS | HEART RATE: 76 BPM | BODY MASS INDEX: 30.41 KG/M2

## 2020-07-20 DIAGNOSIS — H35.3290 EXUDATIVE AGE-RELATED MACULAR DEGENERATION, UNSPECIFIED LATERALITY, UNSPECIFIED STAGE (HCC): Primary | ICD-10-CM

## 2020-07-20 DIAGNOSIS — G20 PARKINSON'S DISEASE (HCC): ICD-10-CM

## 2020-07-20 DIAGNOSIS — E03.9 ACQUIRED HYPOTHYROIDISM: ICD-10-CM

## 2020-07-20 DIAGNOSIS — Z13.31 DEPRESSION SCREENING: ICD-10-CM

## 2020-07-20 DIAGNOSIS — E78.5 HYPERLIPIDEMIA, UNSPECIFIED HYPERLIPIDEMIA TYPE: ICD-10-CM

## 2020-07-20 DIAGNOSIS — Z00.00 ENCOUNTER FOR ANNUAL HEALTH EXAMINATION: ICD-10-CM

## 2020-07-20 PROCEDURE — G0439 PPPS, SUBSEQ VISIT: HCPCS | Performed by: FAMILY MEDICINE

## 2020-07-20 PROCEDURE — G0444 DEPRESSION SCREEN ANNUAL: HCPCS | Performed by: FAMILY MEDICINE

## 2020-07-20 NOTE — PATIENT INSTRUCTIONS
Dakotah Cook's SCREENING SCHEDULE   Tests on this list are recommended by your physician but may not be covered, or covered at this frequency, by your insurer. Please check with your insurance carrier before scheduling to verify coverage.    PREVENTAT • Men who are 73-68 years old and have smoked more than 100 cigarettes in their lifetime   • Anyone with a family history    Colorectal Cancer Screening  Covered up to Age 76     Colonoscopy Screen   Covered every 10 years- more often if abnormal There a Immunizations      Influenza  Covered Annually Orders placed or performed in visit on 10/11/19   • FLU VACC HIGH DOSE PRSV FREE   Orders placed or performed in visit on 11/16/16   • FLU VACC PRSV FREE INC ANTIG   Orders placed or performed in visit on 11 including necessary form from the [facility]. http://www. idph.Counts include 234 beds at the [facility]. [place]public/books/advin.htm  A link to the [employer]  This site has a lot of good information including definitions of the different t

## 2020-07-20 NOTE — PROGRESS NOTES
HPI:   Gogo Lemus is a 80year old female who presents for a Medicare Subsequent Annual Wellness visit (Pt already had Initial Annual Wellness).     No complaints          Fall/Risk Assessment   She has been screened for Falls and is low risk: Fall/ Patient Care Team:  Abi Mendez DO as PCP - General (Family Practice)  Abi Mendez DO as [facility]  Yareli Lester DO as Consulting Physician (NEUROLOGY)    Patient Active Problem List:     Exudative senile macular degeneration of ret (9/14/2011), Hearing impairment, High cholesterol, HYPERLIPIDEMIA, HYPERTHYROIDISM, Lumbosacral spondylosis without myelopathy (9/29/2008), Nondisplaced fracture of fifth left metatarsal bone (9/19/2014), Osteoarthritis, Osteoarthritis of lumbar spine, Neeta Vazquez Acuity  Right Eye Visual Acuity: Corrected Right Eye Chart Acuity: 20/400   Left Eye Visual Acuity: Corrected Left Eye Chart Acuity: 20/80   Both Eyes Visual Acuity: Corrected Both Eyes Chart Acuity: 20/80   Able To Tolerate Visual Acuity: No(has macular d 10/29/2009, 11/03/2010, 12/01/2011, 11/02/2012, 10/04/2013   • Lucentis Per 0.1 Mg Injection 02/24/2010, 03/23/2010, 04/27/2010, 09/07/2010, 10/13/2010, 11/09/2010, 07/31/2012, 09/04/2012, 10/02/2012, 11/06/2012, 12/18/2012, 01/29/2013, 03/19/2013, 04/16/2 review of chart, separate sheet to patient  [address] Internal Lab or Procedure External Lab or Procedure   Diabetes Screening      HbgA1C   Annually No results found for: A1C No flowsheet data found.     Fasting Blood Pneumococcal 13 (Prevnar)  Covered Once after 65 05/18/2016 Please get once after your 65th birthday    Pneumococcal 23 (Pneumovax)  Covered Once after 65 11/30/2006 Please get once after your 65th birthday    Hepatitis B for Moderate/High Risk No vacci

## 2020-08-13 ENCOUNTER — PATIENT MESSAGE (OUTPATIENT)
Dept: FAMILY MEDICINE CLINIC | Facility: CLINIC | Age: 85
End: 2020-08-13

## 2020-08-13 NOTE — TELEPHONE ENCOUNTER
From: Desiree Cook  To: Omid De Jesus DO  Sent: 8/13/2020 8:54 AM CDT  Subject: Non-Urgent Medical Question    Taking Benefiber every night isn't working  The directions say to take it 3 times a day. Should I try hat?   And the pain in my left hip

## 2020-08-17 DIAGNOSIS — E78.5 HYPERLIPIDEMIA, UNSPECIFIED HYPERLIPIDEMIA TYPE: ICD-10-CM

## 2020-08-18 RX ORDER — SIMVASTATIN 20 MG
20 TABLET ORAL NIGHTLY
Qty: 90 TABLET | Refills: 0 | Status: SHIPPED | OUTPATIENT
Start: 2020-08-18 | End: 2020-11-16

## 2020-08-18 RX ORDER — LEVOTHYROXINE SODIUM 88 UG/1
88 TABLET ORAL
Qty: 90 TABLET | Refills: 3 | Status: SHIPPED | OUTPATIENT
Start: 2020-08-18 | End: 2021-08-20

## 2020-08-18 NOTE — TELEPHONE ENCOUNTER
Levothyroxine   Last refill: 09/13/19  Qty: 90  W/ 3 refills  Last ov: 07/20/20    Simvastatin   Last refill: 03/30/20  Qty: 90  W/ 0 refills  Last ov: 07/20/20      Requested Prescriptions     Pending Prescriptions Disp Refills   • Levothyroxine Sodium 88

## 2020-09-30 DIAGNOSIS — G20 PARKINSON'S DISEASE (HCC): ICD-10-CM

## 2020-10-01 NOTE — TELEPHONE ENCOUNTER
Due for appt, sent [employer] message    Medication:  CARBIDOPA-LEVODOPA  MG Oral Tab    Date of last refill: 5/26/2020 (#270/0)  [place]    Last office visit: 6/2/2020  Due back to clinic per last office note:  4

## 2020-10-02 DIAGNOSIS — G20 PARKINSON'S DISEASE (HCC): ICD-10-CM

## 2020-10-02 NOTE — TELEPHONE ENCOUNTER
Pt called to check status of this script; pt made f/u appts; pt will be out of medication by Sunday.

## 2020-10-02 NOTE — TELEPHONE ENCOUNTER
Medication: CARBIDOPA-LEVODOPA  MG Oral Tab    Date of last refill: 05/26/2020 (#270/0)  [place]    Last office visit: 06/02/2020  Due back to clinic per last office note:  Around 10/02/2020  Date next office v

## 2020-10-07 NOTE — TELEPHONE ENCOUNTER
See encounter dated 10/2/2020. Refill processed under that TE and sent to pharmacy. Disregarded duplicate at this time.

## 2020-11-09 ENCOUNTER — TELEPHONE (OUTPATIENT)
Dept: FAMILY MEDICINE CLINIC | Facility: CLINIC | Age: 85
End: 2020-11-09

## 2020-11-09 NOTE — TELEPHONE ENCOUNTER
HAS APPT 11/13 FOR INJECTION IN EYE @ [facility], ALSO HAS APPT HERE SAT 11/14 FOR FLU SHOT, IS THIS A PROBLEM TO HAVE THESE 2 APPTS SO CLOSE TOGETHER?

## 2020-11-14 ENCOUNTER — IMMUNIZATION (OUTPATIENT)
Dept: FAMILY MEDICINE CLINIC | Facility: CLINIC | Age: 85
End: 2020-11-14
Payer: MEDICARE

## 2020-11-14 DIAGNOSIS — Z23 NEED FOR VACCINATION: ICD-10-CM

## 2020-11-14 PROCEDURE — 90662 IIV NO PRSV INCREASED AG IM: CPT | Performed by: FAMILY MEDICINE

## 2020-11-14 PROCEDURE — G0008 ADMIN INFLUENZA VIRUS VAC: HCPCS | Performed by: FAMILY MEDICINE

## 2020-11-16 DIAGNOSIS — E78.5 HYPERLIPIDEMIA, UNSPECIFIED HYPERLIPIDEMIA TYPE: ICD-10-CM

## 2020-11-16 RX ORDER — SIMVASTATIN 20 MG
20 TABLET ORAL NIGHTLY
Qty: 90 TABLET | Refills: 0 | Status: SHIPPED | OUTPATIENT
Start: 2020-11-16 | End: 2021-02-15

## 2020-11-16 NOTE — TELEPHONE ENCOUNTER
LOV: 7/20/20    LAST LAB: 3/5/20    LAST RX: 8/18/20    Next OV:   Future Appointments   Date Time Provider Corona Olivo   11/18/2020  2:50 PM DO ANITA Smith [facility]   12/15/2020 10:40 AM DO PERNELL Smith [facility] Veronique Arce

## 2020-11-18 ENCOUNTER — VIRTUAL PHONE E/M (OUTPATIENT)
Dept: NEUROLOGY | Facility: CLINIC | Age: 85
End: 2020-11-18
Payer: MEDICARE

## 2020-11-18 DIAGNOSIS — G20 PARKINSON'S DISEASE (HCC): Primary | ICD-10-CM

## 2020-11-18 PROCEDURE — 99441 PHONE E/M BY PHYS 5-10 MIN: CPT | Performed by: OTHER

## 2020-11-18 NOTE — PROGRESS NOTES
Please note that the following visit was completed using two-way, real-time interactive audio and/or video communication.  This has been done in good silvina to provide continuity of care in the best interest of the provider-patient relationship, due to the visit:     Parkinson's disease [facility])          Adalgisa Naylor DO  Neurology

## 2020-12-01 ENCOUNTER — PATIENT MESSAGE (OUTPATIENT)
Dept: NEUROLOGY | Facility: CLINIC | Age: 85
End: 2020-12-01

## 2020-12-01 NOTE — TELEPHONE ENCOUNTER
From: Jesusita Dior  To: Angelo Arteaga DO  Sent: 12/1/2020 10:24 AM CST  Subject: Non-Urgent Medical Question    I have bowel problems and take milk of magnesia and benefiber - my daughter thinks i rich uld take s ome probiotic. .  I would ask you fi

## 2020-12-03 ENCOUNTER — PATIENT MESSAGE (OUTPATIENT)
Dept: FAMILY MEDICINE CLINIC | Facility: CLINIC | Age: 85
End: 2020-12-03

## 2020-12-03 NOTE — TELEPHONE ENCOUNTER
From: Deanne Cook  To: Clarita Gauthier DO  Sent: 12/3/2020 12:40 PM CST  Subject: Non-Urgent Medical Question    Oh. .. I forgot to mention that I still have t he constipation problem. . I take Benefiber and Milk of magnesia and have now started on a

## 2020-12-04 NOTE — TELEPHONE ENCOUNTER
Pretty unlikely that the constipation has anything to do with the hip problem but some people will have back pain from straining. We could get an x-ray of the hip. It might be helpful to know how bad the arthritis is just from a prognosis standpoint.   Sh

## 2020-12-08 ENCOUNTER — TELEPHONE (OUTPATIENT)
Dept: FAMILY MEDICINE CLINIC | Facility: CLINIC | Age: 85
End: 2020-12-08

## 2020-12-08 NOTE — TELEPHONE ENCOUNTER
Pt. Zuleima Decker she never saw Dr. Narendra Mcnulty response in My chart so she has some questions re: her back pain.

## 2020-12-08 NOTE — TELEPHONE ENCOUNTER
Pt advised. She state pain is not too bad. It is intermittent. She does not want any invasive procedures d/t her age. She is willing to do Xray, but would like to wait until after En to schedule. Pt advised to call back before then if sx worsen.  Octavio

## 2020-12-15 ENCOUNTER — OFFICE VISIT (OUTPATIENT)
Dept: NEUROLOGY | Facility: CLINIC | Age: 85
End: 2020-12-15
Payer: MEDICARE

## 2020-12-15 VITALS
HEIGHT: 62 IN | RESPIRATION RATE: 18 BRPM | WEIGHT: 160 LBS | BODY MASS INDEX: 29.44 KG/M2 | SYSTOLIC BLOOD PRESSURE: 126 MMHG | DIASTOLIC BLOOD PRESSURE: 66 MMHG | HEART RATE: 68 BPM

## 2020-12-15 DIAGNOSIS — G20 PARKINSON'S DISEASE (HCC): Primary | ICD-10-CM

## 2020-12-15 PROCEDURE — 99213 OFFICE O/P EST LOW 20 MIN: CPT | Performed by: OTHER

## 2020-12-15 NOTE — PROGRESS NOTES
Neurology H&P    [address] [place] [address] Patient Status:  No patient class for patient encounter    1930 MRN [id number]   Location [facility] Attending No att. providers found   Hosp Day # 0 PCP Demetrius Dickson DO     Subjective:  Initial Cli simvastatin 20 MG Oral Tab Take 1 tablet (20 mg total) by mouth nightly. 90 tablet 0   • Levothyroxine Sodium 88 MCG Oral Tab Take 1 tablet (88 mcg total) by mouth before breakfast. 90 tablet 3   • aspirin 81 MG Oral Tab Take 81 mg by mouth daily.      • do Years: 15.00        Pack years: 4.5        Types: Cigarettes        Quit date: 1965        Years since quittin.9      Smokeless tobacco: Former User    Alcohol use: No    Drug use: No      Family History:  Family History   Problem Relation Age of LE: intact to light touch     COORDINATION:  No dysmetria, mild UE intention tremor, mild R hand resting tremor    REFLEXES: 2+ at biceps, 2+ triceps, 2+ at patella    GAIT: normal stance, normal toe gait, normal toe gait, mildly reduced RUE arm swing, n

## 2020-12-15 NOTE — PATIENT INSTRUCTIONS
After your visit at the [place] office  today,  please direct any follow up questions or medication needs to the staff in our [place] office so that your concerns may be promptly addressed.   We are available through [employer] or at the numbers below: picked up in office. • Please allow the office 2-3 business days to fill the prescription. • Patient must present photo ID at time of . PLEASE NOTE: PRESCRIPTIONS MUST BE PICKED UP PRIOR TO 3:00PM MONDAY-FRIDAY    Scheduling Tests:     If your ph submitting forms to office staff. • Form completion may require an additional fee. • A signed Release of Information (RACHAEL) must be on file before forms may be submitted. When dropping off forms, please ask the  for this paper.    Failure to

## 2020-12-15 NOTE — PROGRESS NOTES
Patient states she does not note any change in her PD. She reports pain/cramping in both feet at night as well left hip and leg pain.

## 2020-12-16 ENCOUNTER — OFFICE VISIT (OUTPATIENT)
Dept: FAMILY MEDICINE CLINIC | Facility: CLINIC | Age: 85
End: 2020-12-16
Payer: MEDICARE

## 2020-12-16 VITALS
SYSTOLIC BLOOD PRESSURE: 118 MMHG | TEMPERATURE: 97 F | OXYGEN SATURATION: 95 % | DIASTOLIC BLOOD PRESSURE: 80 MMHG | HEART RATE: 81 BPM | RESPIRATION RATE: 16 BRPM | WEIGHT: 161 LBS | HEIGHT: 62 IN | BODY MASS INDEX: 29.63 KG/M2

## 2020-12-16 DIAGNOSIS — M25.552 LEFT HIP PAIN: Primary | ICD-10-CM

## 2020-12-16 PROCEDURE — 99213 OFFICE O/P EST LOW 20 MIN: CPT | Performed by: FAMILY MEDICINE

## 2020-12-16 NOTE — PROGRESS NOTES
Erma Wooten is a 80year old female. Patient presents with:  Pain: left hip      HPI:   Patient complains of pain to the posterior left hip. Worse after she sat and tries to get up. Also bad in the morning when she first gets out of bed.   Gets bett By Dexa   • Parkinson's disease ([facility]    • Uterovaginal prolapse, incomplete 1/12/2012    Cystocele w/incomplete uterine prolaspse   • Visual impairment     glasses   • Vitamin D deficiency 7/26/2012     Past Surgical History:   Procedure Laterality Date edema          ASSESSMENT AND PLAN:     Left hip pain  (primary encounter diagnosis)    I think she would benefit from physical therapy. Order will be placed. If no improvement, return to clinic.   She states that her age she would never agree to a hip re

## 2021-01-07 ENCOUNTER — PATIENT MESSAGE (OUTPATIENT)
Dept: FAMILY MEDICINE CLINIC | Facility: CLINIC | Age: 86
End: 2021-01-07

## 2021-01-07 DIAGNOSIS — R30.0 DYSURIA: Primary | ICD-10-CM

## 2021-01-07 RX ORDER — CEPHALEXIN 500 MG/1
500 CAPSULE ORAL 3 TIMES DAILY
Qty: 15 CAPSULE | Refills: 0 | Status: SHIPPED | OUTPATIENT
Start: 2021-01-07 | End: 2021-01-22 | Stop reason: ALTCHOICE

## 2021-01-07 NOTE — TELEPHONE ENCOUNTER
Called patient and given instructions; antibiotic sent to [facility]. Patient reports burning with urination just first thing in the morning.

## 2021-01-07 NOTE — TELEPHONE ENCOUNTER
From: Wild Cook  To: Abdi Sanches DO  Sent: 1/7/2021 10:37 AM CST  Subject: Other    I think I am getting a urinal - I can't think of the word, but it burns. .  Can you order a medication for this or do I need to come to the office?   You know,

## 2021-01-07 NOTE — TELEPHONE ENCOUNTER
Please call in Keflex 500 mg 3 times a day for 5 days. If it does not help, then we will need to get a urine sample.

## 2021-01-21 ENCOUNTER — PATIENT MESSAGE (OUTPATIENT)
Dept: FAMILY MEDICINE CLINIC | Facility: CLINIC | Age: 86
End: 2021-01-21

## 2021-01-22 NOTE — TELEPHONE ENCOUNTER
From: Desiree Cook  To: Omid DeJ esus DO  Sent: 1/21/2021 8:02 PM CST  Subject: Non-Urgent Medical Question    Thanks. . my infection cleared up right away. I still have the bad pain in my lower back and really bad in left hip bone area.   Ist is re

## 2021-01-22 NOTE — TELEPHONE ENCOUNTER
She could try Voltaren gel which she can get over-the-counter.   It may help with the back pain and hip pain

## 2021-01-25 ENCOUNTER — PATIENT MESSAGE (OUTPATIENT)
Dept: FAMILY MEDICINE CLINIC | Facility: CLINIC | Age: 86
End: 2021-01-25

## 2021-01-25 NOTE — TELEPHONE ENCOUNTER
Yes it is okay to still use the Voltaren. I have never heard of Parkinson's causing itchy welts on the palms.

## 2021-01-25 NOTE — TELEPHONE ENCOUNTER
From: Jackie Cook  To: Anay Matta DO  Sent: 1/25/2021 3:58 PM CST  Subject: Prescription Question    They onnly have the Volteran for feet, knees, hands and elbows. . at [facility]  Should I order it from [facility]? Or should I try s omething else? ?   A

## 2021-01-26 ENCOUNTER — PATIENT MESSAGE (OUTPATIENT)
Dept: NEUROLOGY | Facility: CLINIC | Age: 86
End: 2021-01-26

## 2021-01-26 NOTE — TELEPHONE ENCOUNTER
From: Jackie Cook  To: Zabrina Booker DO  Sent: 1/26/2021 10:57 AM CST  Subject: Prescription Question    I have t his real pain in my lower back and left hip. I take tyenol and use heat and ice. Dr. Lynn Riggs suggested I use Voltaren.  I got some

## 2021-02-13 DIAGNOSIS — E78.5 HYPERLIPIDEMIA, UNSPECIFIED HYPERLIPIDEMIA TYPE: ICD-10-CM

## 2021-02-15 RX ORDER — SIMVASTATIN 20 MG
20 TABLET ORAL NIGHTLY
Qty: 30 TABLET | Refills: 0 | Status: SHIPPED | OUTPATIENT
Start: 2021-02-15 | End: 2021-02-22

## 2021-02-15 NOTE — TELEPHONE ENCOUNTER
Last refill #90 on 11/16/2020  Last office visit pertaining to refill on 1/23/2020  Future Appointments   Date Time Provider Corona Olivo   6/16/2021 11:00 AM DO PERNELL Cantu     Patient is due for annual physical exam

## 2021-02-22 ENCOUNTER — OFFICE VISIT (OUTPATIENT)
Dept: FAMILY MEDICINE CLINIC | Facility: CLINIC | Age: 86
End: 2021-02-22
Payer: MEDICARE

## 2021-02-22 VITALS
WEIGHT: 160 LBS | HEART RATE: 81 BPM | TEMPERATURE: 98 F | BODY MASS INDEX: 29.44 KG/M2 | DIASTOLIC BLOOD PRESSURE: 70 MMHG | SYSTOLIC BLOOD PRESSURE: 124 MMHG | HEIGHT: 62 IN | OXYGEN SATURATION: 97 % | RESPIRATION RATE: 14 BRPM

## 2021-02-22 DIAGNOSIS — C21.0 ANAL SQUAMOUS CELL CARCINOMA (HCC): ICD-10-CM

## 2021-02-22 DIAGNOSIS — H35.3290 EXUDATIVE AGE-RELATED MACULAR DEGENERATION, UNSPECIFIED LATERALITY, UNSPECIFIED STAGE (HCC): ICD-10-CM

## 2021-02-22 DIAGNOSIS — L82.1 SEBORRHEIC KERATOSES: Primary | ICD-10-CM

## 2021-02-22 DIAGNOSIS — K59.09 OTHER CONSTIPATION: ICD-10-CM

## 2021-02-22 PROCEDURE — 99214 OFFICE O/P EST MOD 30 MIN: CPT | Performed by: FAMILY MEDICINE

## 2021-02-22 RX ORDER — SIMVASTATIN 20 MG
20 TABLET ORAL NIGHTLY
Qty: 90 TABLET | Refills: 3 | Status: SHIPPED | OUTPATIENT
Start: 2021-02-22

## 2021-02-22 NOTE — PROGRESS NOTES
Linnea iGl is a 80year old female. Patient presents with: Follow - Up: Room 2- Medication followup      HPI:   Patient has multiple complaints. She is constipated off and on. She takes milk of magnesia which helps. She gets leg cramps at night. Patient does not want to treat   • Eczema    • Esophageal reflux 4/19/2007   • Exudative senile macular degeneration [facility] ([facility] 09/14/2011   • Hearing impairment     no hearing aids   • HYPERLIPIDEMIA    • Hypothyroidism    • Lumbosacral spondylosis w from Last 6 Encounters:  02/22/21 : 160 lb (72.6 kg)  12/16/20 : 161 lb (73 kg)  12/15/20 : 160 lb (72.6 kg)  07/20/20 : 165 lb 4 oz (75 kg)  02/11/20 : 169 lb (76.7 kg)  02/03/20 : 169 lb 5 oz (76.8 kg)    Ideal body weight: 50.1 kg (110 lb 7.2 oz)  Adjus

## 2021-02-24 ENCOUNTER — PATIENT MESSAGE (OUTPATIENT)
Dept: FAMILY MEDICINE CLINIC | Facility: CLINIC | Age: 86
End: 2021-02-24

## 2021-02-24 NOTE — TELEPHONE ENCOUNTER
From: Jackie Cook  To: Anay Matta DO  Sent: 2/24/2021 11:14 AM CST  Subject: Visit Follow-up Question    I don't remember. .. Did you s ay I should not use that voltaren any more?

## 2021-03-03 DIAGNOSIS — Z23 NEED FOR VACCINATION: ICD-10-CM

## 2021-03-05 ENCOUNTER — PATIENT MESSAGE (OUTPATIENT)
Dept: NEUROLOGY | Facility: CLINIC | Age: 86
End: 2021-03-05

## 2021-03-08 NOTE — TELEPHONE ENCOUNTER
From: Dakotah Cook  To: Angelo Arteaga,   Sent: 3/5/2021 8:13 PM CST  Subject: Non-Urgent Medical Question    I am not sure what I should do. . Having the Parkinson's and living alone. and being [address] old. .  What would you suggest I do ab out th

## 2021-03-11 ENCOUNTER — PATIENT OUTREACH (OUTPATIENT)
Dept: FAMILY MEDICINE CLINIC | Facility: CLINIC | Age: 86
End: 2021-03-11

## 2021-03-17 ENCOUNTER — PATIENT MESSAGE (OUTPATIENT)
Dept: FAMILY MEDICINE CLINIC | Facility: CLINIC | Age: 86
End: 2021-03-17

## 2021-03-17 NOTE — TELEPHONE ENCOUNTER
From: Randolph Cook  To: Alecia Marte DO  Sent: 3/17/2021 3:08 PM CDT  Subject: Other    Do you recommend that I get the vaccination?

## 2021-05-13 ENCOUNTER — PATIENT OUTREACH (OUTPATIENT)
Dept: FAMILY MEDICINE CLINIC | Facility: CLINIC | Age: 86
End: 2021-05-13

## 2021-06-02 ENCOUNTER — PATIENT MESSAGE (OUTPATIENT)
Dept: FAMILY MEDICINE CLINIC | Facility: CLINIC | Age: 86
End: 2021-06-02

## 2021-06-02 NOTE — TELEPHONE ENCOUNTER
From: Roberto Cook  To: Gabriel Montana DO  Sent: 6/2/2021 11:11 AM CDT  Subject: Non-Urgent Medical Question    when I wake up, my fee hurt and are - I can't think of the word - but I have to walk around to wake them up. It is weird. . It's like th

## 2021-06-05 ENCOUNTER — OFFICE VISIT (OUTPATIENT)
Dept: FAMILY MEDICINE CLINIC | Facility: CLINIC | Age: 86
End: 2021-06-05
Payer: MEDICARE

## 2021-06-05 VITALS
DIASTOLIC BLOOD PRESSURE: 78 MMHG | WEIGHT: 158.13 LBS | OXYGEN SATURATION: 97 % | TEMPERATURE: 98 F | RESPIRATION RATE: 12 BRPM | HEIGHT: 62 IN | BODY MASS INDEX: 29.1 KG/M2 | SYSTOLIC BLOOD PRESSURE: 130 MMHG | HEART RATE: 76 BPM

## 2021-06-05 DIAGNOSIS — M25.552 LEFT HIP PAIN: Primary | ICD-10-CM

## 2021-06-05 DIAGNOSIS — E78.5 HYPERLIPIDEMIA, UNSPECIFIED HYPERLIPIDEMIA TYPE: ICD-10-CM

## 2021-06-05 DIAGNOSIS — E03.9 ACQUIRED HYPOTHYROIDISM: ICD-10-CM

## 2021-06-05 PROCEDURE — 80053 COMPREHEN METABOLIC PANEL: CPT | Performed by: FAMILY MEDICINE

## 2021-06-05 PROCEDURE — 99214 OFFICE O/P EST MOD 30 MIN: CPT | Performed by: FAMILY MEDICINE

## 2021-06-05 PROCEDURE — 80061 LIPID PANEL: CPT | Performed by: FAMILY MEDICINE

## 2021-06-05 PROCEDURE — 84443 ASSAY THYROID STIM HORMONE: CPT | Performed by: FAMILY MEDICINE

## 2021-06-05 NOTE — PROGRESS NOTES
Grey Carrillo is a 80year old female. Patient presents with: Other: pt states first thing in the morning she gets bilateral feet cramp . pt is concern about her hip pain , she states she likes to cook but bending is when she feels pain inrm 2      HPI Lumbosacral spondylosis without myelopathy 9/29/2008    Osteoarthritis Lumbosacral spine   • Nondisplaced fracture of fifth left metatarsal bone 9/19/2014   • Osteoarthritis of lumbar spine    • Osteoporosis 4/19/2007    By Dexa   • Parkinson's disease (HC weight: 58.7 kg (129 lb 8.3 oz)    GENERAL: well developed, well nourished,in no apparent distress  SKIN: no rashes,no suspicious lesions  HEENT: atraumatic, normocephalic, R TM normal, L TM normal, Pharynx normal  NECK: supple, no cervical adenopathy  JEAN-PIERRE

## 2021-06-07 DIAGNOSIS — E03.9 ACQUIRED HYPOTHYROIDISM: ICD-10-CM

## 2021-06-07 DIAGNOSIS — E78.5 HYPERLIPIDEMIA, UNSPECIFIED HYPERLIPIDEMIA TYPE: Primary | ICD-10-CM

## 2021-06-08 ENCOUNTER — PATIENT MESSAGE (OUTPATIENT)
Dept: FAMILY MEDICINE CLINIC | Facility: CLINIC | Age: 86
End: 2021-06-08

## 2021-06-08 NOTE — TELEPHONE ENCOUNTER
From: Pat Cook  To: Vicky Warner DO  Sent: 6/8/2021 10:19 AM CDT  Subject: Test Results Question    I have read as much as I can and don't really understand any of it but  Is there anything hat Is hould change or do because of t he tests?   My

## 2021-06-08 NOTE — TELEPHONE ENCOUNTER
Spoke to patient, went over lab results and MD recommendations. She denies any further questions/concerns.

## 2021-06-13 DIAGNOSIS — G20 PARKINSON'S DISEASE (HCC): ICD-10-CM

## 2021-06-14 NOTE — TELEPHONE ENCOUNTER
Medication: CARBIDOPA-LEVODOPA  MG     Date of last refill: 11/18/20 (#270/1)  [place]     Last office visit: 12/15/2020  Due back to clinic per last office note:  6 months  Date next office visit scheduled:     Fut

## 2021-06-16 ENCOUNTER — OFFICE VISIT (OUTPATIENT)
Dept: NEUROLOGY | Facility: CLINIC | Age: 86
End: 2021-06-16
Payer: MEDICARE

## 2021-06-16 VITALS
WEIGHT: 159 LBS | DIASTOLIC BLOOD PRESSURE: 58 MMHG | BODY MASS INDEX: 29 KG/M2 | RESPIRATION RATE: 14 BRPM | SYSTOLIC BLOOD PRESSURE: 124 MMHG | HEART RATE: 70 BPM

## 2021-06-16 DIAGNOSIS — G20 PARKINSON'S DISEASE (HCC): Primary | ICD-10-CM

## 2021-06-16 PROCEDURE — 99213 OFFICE O/P EST LOW 20 MIN: CPT | Performed by: OTHER

## 2021-06-16 NOTE — PATIENT INSTRUCTIONS
After your visit at the [place] office  today,  please direct any follow up questions or medication needs to the staff in our [place] office so that your concerns may be promptly addressed.   We are available through [employer] or at the numbers below: be picked up in office. • Please allow the office 2-3 business days to fill the prescription. • Patient must present photo ID at time of . PLEASE NOTE: PRESCRIPTIONS MUST BE PICKED UP PRIOR TO 3:00PM MONDAY-FRIDAY    Scheduling Tests:     If your submitting forms to office staff. • Form completion may require an additional fee. • A signed Release of Information (RACHAEL) must be on file before forms may be submitted. When dropping off forms, please ask the  for this paper.    • Failure

## 2021-06-16 NOTE — PROGRESS NOTES
Patient reports parkinsons symptoms have been stable. When she wakes up in the morning she notes cramping in her feet that is painful. She reports she is having difficulties sleeping.

## 2021-06-16 NOTE — PROGRESS NOTES
Neurology H&P    [address] [place] [address] Patient Status:  No patient class for patient encounter    1930 MRN [id number]   Location [facility] Attending No att. providers found   Hosp Day # 0 PCP Anay Matta DO     Subjective:  Initial Cli constipation. • Wheat Dextrin (BENEFIBER DRINK MIX OR) Take by mouth. • Levothyroxine Sodium 88 MCG Oral Tab Take 1 tablet (88 mcg total) by mouth before breakfast. 90 tablet 3   • aspirin 81 MG Oral Tab Take 81 mg by mouth daily.      • Cholecalcif Alcohol use: No    Drug use: No      Family History:  Family History   Problem Relation Age of Onset   • Eye Problems Mother         AMD   • Cancer Mother         Breast   • Heart Attack Father 67        MI   • Other (CAD) Father    • Cancer Brother gait, mildly reduced RUE arm swing, no en bloc turning, mild shuffling gait, mildly stooped posture       Labs:       Imaging:  MRI Brain 1/2018  =====  CONCLUSION:    1. No acute infarct or intracranial hemorrhage.   2. Mild chronic small vessel ischemic d

## 2021-07-15 ENCOUNTER — OFFICE VISIT (OUTPATIENT)
Dept: FAMILY MEDICINE CLINIC | Facility: CLINIC | Age: 86
End: 2021-07-15
Payer: MEDICARE

## 2021-07-15 VITALS
WEIGHT: 159.5 LBS | HEART RATE: 74 BPM | BODY MASS INDEX: 29.35 KG/M2 | TEMPERATURE: 98 F | SYSTOLIC BLOOD PRESSURE: 112 MMHG | OXYGEN SATURATION: 96 % | DIASTOLIC BLOOD PRESSURE: 60 MMHG | HEIGHT: 62 IN

## 2021-07-15 DIAGNOSIS — E03.9 ACQUIRED HYPOTHYROIDISM: ICD-10-CM

## 2021-07-15 DIAGNOSIS — E78.5 HYPERLIPIDEMIA, UNSPECIFIED HYPERLIPIDEMIA TYPE: ICD-10-CM

## 2021-07-15 DIAGNOSIS — Z13.31 DEPRESSION SCREENING: ICD-10-CM

## 2021-07-15 DIAGNOSIS — Z00.00 ENCOUNTER FOR ANNUAL HEALTH EXAMINATION: Primary | ICD-10-CM

## 2021-07-15 DIAGNOSIS — G20 PARKINSON'S DISEASE (HCC): ICD-10-CM

## 2021-07-15 PROCEDURE — G0444 DEPRESSION SCREEN ANNUAL: HCPCS | Performed by: FAMILY MEDICINE

## 2021-07-15 PROCEDURE — G0439 PPPS, SUBSEQ VISIT: HCPCS | Performed by: FAMILY MEDICINE

## 2021-07-15 NOTE — PATIENT INSTRUCTIONS
Roberto Cook's SCREENING SCHEDULE   Tests on this list are recommended by your physician but may not be covered, or covered at this frequency, by your insurer. Please check with your insurance carrier before scheduling to verify coverage.    PREVENT 07/16/2014      No recommendations at this time   Pap and Pelvic    Pap   Covered every 2 years for women at normal risk;  Annually if at high risk -  No recommendations at this time    Chlamydia Annually if high risk -  No recommendations at this time   Sc regarding Advance Directives.

## 2021-07-15 NOTE — PROGRESS NOTES
HPI:   Erma Wooten is a 80year old female who presents for a Medicare Subsequent Annual Wellness visit (Pt already had Initial Annual Wellness).     No acute complaints         Fall/Risk Assessment   She has been screened for Falls and is low risk: Hyperlipidemia     Hypothyroidism     Parkinson's disease ([facility]    Wt Readings from Last 3 Encounters:  07/15/21 : 159 lb 8 oz (72.3 kg)  06/16/21 : 159 lb (72.1 kg)  06/05/21 : 158 lb 2 oz (71.7 kg)     Last Cholesterol Labs:   Lab Results   Component Kiana Osteoarthritis of lumbar spine, Osteoporosis (4/19/2007), Parkinson's disease ([facility] Uterovaginal prolapse, incomplete (1/12/2012), Visual impairment, and Vitamin D deficiency (7/26/2012).     She  has a past surgical history that includes tonsillectomy; ch 10/10/2017, 09/19/2018   • Fluzone Vaccine Medicare () 10/28/2014, 11/23/2015, 11/16/2016, 10/11/2019   • HIGH DOSE FLU 65 YRS AND OLDER PRSV FREE SINGLE D (96280) FLU CLINIC 11/23/2015, 11/16/2016   • Influenza 11/04/1997, 10/30/1998, 10/29/1999, 12/ activity)  How would you describe your daily physical activity?: Moderate  How would you describe your current health state?: Fair  How do you maintain positive mental well-being?: Visiting Family; Social Interaction (a lot on computer)     Supplementary Do Bone density screening    Covered every 2 years after age 72 if diagnosed with risk of osteoporosis or estrogen deficiency.     Covered yearly for long-term glucocorticoid medication use (Steroids) Last Dexa Scan:    XR DEXA BONE DENSITOMETRY (CPT=77080)

## 2021-07-29 ENCOUNTER — PATIENT MESSAGE (OUTPATIENT)
Dept: FAMILY MEDICINE CLINIC | Facility: CLINIC | Age: 86
End: 2021-07-29

## 2021-07-29 NOTE — TELEPHONE ENCOUNTER
From: Sara Cook  To: Severiano Dominguez DO  Sent: 7/29/2021 2:44 PM CDT  Subject: Non-Urgent Medical Question    Tuesday, I bent over and got a pain in my right ribs. . sort of like a \"pop\"  It didn't hurt then but later - from time to time, it kirsten

## 2021-08-04 ENCOUNTER — PATIENT MESSAGE (OUTPATIENT)
Dept: FAMILY MEDICINE CLINIC | Facility: CLINIC | Age: 86
End: 2021-08-04

## 2021-08-04 NOTE — TELEPHONE ENCOUNTER
From: Dakotah Cook  To: Javon Teran DO  Sent: 8/4/2021 12:04 PM CDT  Subject: Non-Urgent Medical Question    I am confused. .. Should I stay away from people who are not being vaccinated? My son, for one, .. I don't know what to do. Laurie Birmignham

## 2021-08-04 NOTE — TELEPHONE ENCOUNTER
Should she continue to social distance; stay 6ft away and wear a mask even though she's been vaccinated? See pt msg.

## 2021-08-21 RX ORDER — LEVOTHYROXINE SODIUM 88 UG/1
TABLET ORAL
Qty: 90 TABLET | Refills: 0 | OUTPATIENT
Start: 2021-08-21

## 2021-08-21 RX ORDER — LEVOTHYROXINE SODIUM 88 UG/1
88 TABLET ORAL
Qty: 90 TABLET | Refills: 2 | Status: SHIPPED | OUTPATIENT
Start: 2021-08-21 | End: 2021-11-15

## 2021-08-21 NOTE — TELEPHONE ENCOUNTER
Thyroid Supplements Protocol [id number]/2021 05:17 PM   TSH test in past 12 months Protocol Details    TSH value between 0.350 and 5.500 IU/ml     Appointment in past 12 or next 3 months        Last office visit:  07/15/21  Last refill:  08/18/20   #90, 3

## 2021-09-14 DIAGNOSIS — G20 PARKINSON'S DISEASE (HCC): ICD-10-CM

## 2021-09-14 NOTE — TELEPHONE ENCOUNTER
Medication: CARBIDOPA-LEVODOPA  MG Oral Tab    Date of last refill: 06/14/2021 (#270/0)  [place]    Last office visit: 06/16/2021  Due back to clinic per last office note:  Around 12/16/2021  Date next office v

## 2021-10-11 ENCOUNTER — OFFICE VISIT (OUTPATIENT)
Dept: FAMILY MEDICINE CLINIC | Facility: CLINIC | Age: 86
End: 2021-10-11
Payer: MEDICARE

## 2021-10-11 VITALS
WEIGHT: 160.13 LBS | TEMPERATURE: 98 F | HEART RATE: 83 BPM | SYSTOLIC BLOOD PRESSURE: 118 MMHG | DIASTOLIC BLOOD PRESSURE: 66 MMHG | OXYGEN SATURATION: 97 % | BODY MASS INDEX: 29 KG/M2 | RESPIRATION RATE: 16 BRPM

## 2021-10-11 DIAGNOSIS — K59.09 OTHER CONSTIPATION: ICD-10-CM

## 2021-10-11 DIAGNOSIS — G20 PARKINSON'S DISEASE (HCC): Primary | ICD-10-CM

## 2021-10-11 DIAGNOSIS — Z23 NEED FOR VACCINATION: ICD-10-CM

## 2021-10-11 PROCEDURE — G0008 ADMIN INFLUENZA VIRUS VAC: HCPCS | Performed by: FAMILY MEDICINE

## 2021-10-11 PROCEDURE — 99214 OFFICE O/P EST MOD 30 MIN: CPT | Performed by: FAMILY MEDICINE

## 2021-10-11 PROCEDURE — 90662 IIV NO PRSV INCREASED AG IM: CPT | Performed by: FAMILY MEDICINE

## 2021-10-11 NOTE — PROGRESS NOTES
Sanjeev Jean is a 80year old female. Patient presents with: Other: flu shot, also several other issues. ...room 2      HPI:   Patient complains her macular degeneration is bad. Her hearing is bad.   When she wakes up to go to the bathroom during the carcinoma ([facility]     3/2020 Dr Tong Delgado Patient does not want to treat   • Eczema    • Esophageal reflux 4/19/2007   • Exudative senile macular degeneration of retina ([facility] 09/14/2011   • Hearing impairment     no hearing aids   • HYPERLIPIDEMIA    • Hypothyro distress  SKIN: no rashes,no suspicious lesions  HEENT: atraumatic, normocephalic, R TM normal, L TM normal, Pharynx normal  NECK: supple, no cervical adenopathy  LUNGS: clear to auscultation  CARDIO: RRR without murmur  ABD soft, nontender, normal BS, no

## 2021-11-15 RX ORDER — LEVOTHYROXINE SODIUM 88 UG/1
TABLET ORAL
Qty: 90 TABLET | Refills: 0 | Status: SHIPPED | OUTPATIENT
Start: 2021-11-15

## 2021-11-15 NOTE — TELEPHONE ENCOUNTER
Thyroid Supplements Protocol Passed 11/15/2021 09:15 AM   Protocol Details  TSH test in past 12 months    TSH value between 0.350 and 5.500 IU/ml    Appointment in past 12 or next 3 months      Last office visit 7/15/21  Last refill 8/21/2021  Last Labs 6

## 2021-11-16 ENCOUNTER — TELEPHONE (OUTPATIENT)
Dept: FAMILY MEDICINE CLINIC | Facility: CLINIC | Age: 86
End: 2021-11-16

## 2021-11-16 NOTE — TELEPHONE ENCOUNTER
Patient received moderna vaccine in April & May. She wanted to know if  recommended her to get the booster.

## 2021-11-16 NOTE — TELEPHONE ENCOUNTER
Pt had Moderna vaccine on 3/3/21 and 4/6/21    Alicia Tavares that Dr. Millie Florse does recommend the COVID booster vaccine. She is eligible for it now and should get moderna again. Verbalized understanding.

## 2021-11-23 ENCOUNTER — TELEPHONE (OUTPATIENT)
Dept: FAMILY MEDICINE CLINIC | Facility: CLINIC | Age: 86
End: 2021-11-23

## 2021-11-23 NOTE — TELEPHONE ENCOUNTER
Pt wanted to know if it would be safe for her to have the booster vaccine, b/c after her last Moderna shot, she developed a bad headache. She states her headache did resolve after laying down and placing an ice pack on her forehead.    Pt did not have any o

## 2021-12-06 DIAGNOSIS — G20 PARKINSON'S DISEASE (HCC): ICD-10-CM

## 2021-12-07 NOTE — TELEPHONE ENCOUNTER
Medication: CARBIDOPA-LEVODOPA  MG     Date of last refill: 9/15/21 (#270/0)  [place]      Last office visit: 6/16/2021  Due back to clinic per last office note:  6 months  Date next office visit scheduled:     Annamarie

## 2021-12-15 ENCOUNTER — OFFICE VISIT (OUTPATIENT)
Dept: NEUROLOGY | Facility: CLINIC | Age: 86
End: 2021-12-15
Payer: MEDICARE

## 2021-12-15 VITALS
DIASTOLIC BLOOD PRESSURE: 60 MMHG | SYSTOLIC BLOOD PRESSURE: 136 MMHG | RESPIRATION RATE: 17 BRPM | HEART RATE: 71 BPM | WEIGHT: 160 LBS | BODY MASS INDEX: 29 KG/M2

## 2021-12-15 DIAGNOSIS — G20 PARKINSON'S DISEASE (HCC): Primary | ICD-10-CM

## 2021-12-15 PROCEDURE — 99213 OFFICE O/P EST LOW 20 MIN: CPT | Performed by: OTHER

## 2021-12-15 NOTE — PATIENT INSTRUCTIONS
After your visit at the [place] office  today,  please direct any follow up questions or medication needs to the staff in our [place] office so that your concerns may be promptly addressed.   We are available through [employer] or at the numbers below: be picked up in office. • Please allow the office 2-3 business days to fill the prescription. • Patient must present photo ID at time of . PLEASE NOTE: PRESCRIPTIONS MUST BE PICKED UP PRIOR TO 3:00PM MONDAY-FRIDAY    Scheduling Tests:     If your submitting forms to office staff. • Form completion may require an additional fee. • A signed Release of Information (RACHAEL) must be on file before forms may be submitted. When dropping off forms, please ask the  for this paper.    • Failure

## 2021-12-15 NOTE — PROGRESS NOTES
Neurology H&P    [address] [place] [address] Patient Status:  No patient class for patient encounter    1930 MRN [id number]   Location [facility] Attending No att. providers found   Hosp Day # 0 PCP Emerald Montaño DO     Subjective:  Initial Cli mouth nightly. 90 tablet 3   • magnesium hydroxide 400 MG/5ML Oral Suspension Take by mouth daily as needed for constipation. • Wheat Dextrin (BENEFIBER DRINK MIX OR) Take by mouth. • aspirin 81 MG Oral Tab Take 81 mg by mouth daily.      • Cholecal Onset   • Eye Problems Mother         AMD   • Cancer Mother         Breast   • Heart Attack Father 67        MI   • Other (CAD) Father    • Cancer Brother         Brain tumor       ROS:  Gen: no unexplained weight loss  Vision: no vision changes, no new bl mildly stooped posture       Labs:       Imaging:  MRI Brain 1/2018  =====  CONCLUSION:    1. No acute infarct or intracranial hemorrhage. 2. Mild chronic small vessel ischemic disease. 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     Assessment:

## 2022-02-07 ENCOUNTER — MED REC SCAN ONLY (OUTPATIENT)
Dept: FAMILY MEDICINE CLINIC | Facility: CLINIC | Age: 87
End: 2022-02-07

## 2022-02-10 RX ORDER — SIMVASTATIN 20 MG
TABLET ORAL
Qty: 90 TABLET | Refills: 0 | Status: SHIPPED | OUTPATIENT
Start: 2022-02-10

## 2022-02-10 NOTE — TELEPHONE ENCOUNTER
Cholesterol Medication Protocol Passed 02/10/2022 12:10 PM   Protocol Details  ALT < 80    ALT resulted within past year    Lipid panel within past 12 months    Appointment within past 12 or next 3 months        Last office visit:  10/11/21  Last refill:  02/22/21  #90, 3 refills  Last lipid:  06/05/21        No future office visits with pcp.

## 2022-03-08 ENCOUNTER — TELEPHONE (OUTPATIENT)
Dept: FAMILY MEDICINE CLINIC | Facility: CLINIC | Age: 87
End: 2022-03-08

## 2022-03-08 NOTE — TELEPHONE ENCOUNTER
She states she went to pick-up her medication and her carbidopa-levadopa and simvastatin look \"too much alike\". She wants to make sure she didn't get two bottles with the same medication. Benjamin Barrera that she should take both medications back to the pharmacy to confirm that they are both correct. Verbalized understanding.

## 2022-05-25 NOTE — TELEPHONE ENCOUNTER
- - - Spoke with patient and informed her per Dr. Alejandra Baumann that she can change her dressing twice a day. She can use antibiotic ointment and telfa. If she has any further issues, she can call the oncall Doctor with [facility].  Otherwise she can call Dr. Shirley Apley

## 2022-06-08 RX ORDER — SIMVASTATIN 20 MG
20 TABLET ORAL NIGHTLY
Qty: 90 TABLET | Refills: 0 | Status: SHIPPED | OUTPATIENT
Start: 2022-06-08

## 2022-06-08 NOTE — TELEPHONE ENCOUNTER
Cholesterol Medication Protocol Failed 06/07/2022 09:15 AM   Protocol Details  ALT < 80    ALT resulted within past year    Lipid panel within past 12 months    Appointment within past 12 or next 3 months        Last office visit:  10/11/21  Last refill: 02/10/22  #90, no refills  Last lipid:  06/05/21    Future Appointments   Date Time Provider Corona Pallavi   6/15/2022 11:40 AM DO PERNELL Guevara [facility] [place]   7/11/2022 10:30 AM Shaheed Brown MD [facility] [place]

## 2022-06-15 ENCOUNTER — OFFICE VISIT (OUTPATIENT)
Dept: NEUROLOGY | Facility: CLINIC | Age: 87
End: 2022-06-15
Payer: MEDICARE

## 2022-06-15 VITALS
RESPIRATION RATE: 15 BRPM | DIASTOLIC BLOOD PRESSURE: 62 MMHG | WEIGHT: 160 LBS | HEART RATE: 80 BPM | SYSTOLIC BLOOD PRESSURE: 140 MMHG | BODY MASS INDEX: 29 KG/M2

## 2022-06-15 DIAGNOSIS — G20 PARKINSON'S DISEASE (HCC): Primary | ICD-10-CM

## 2022-06-15 PROCEDURE — 99213 OFFICE O/P EST LOW 20 MIN: CPT | Performed by: OTHER

## 2022-06-15 NOTE — PROGRESS NOTES
Pt reports the Carbidopa-Levodopa has helped with tremors, but she notes constipation and difficulty with sleeping.

## 2022-07-11 ENCOUNTER — LABORATORY ENCOUNTER (OUTPATIENT)
Dept: LAB | Age: 87
End: 2022-07-11
Attending: FAMILY MEDICINE
Payer: MEDICARE

## 2022-07-11 ENCOUNTER — OFFICE VISIT (OUTPATIENT)
Dept: FAMILY MEDICINE CLINIC | Facility: CLINIC | Age: 87
End: 2022-07-11
Payer: MEDICARE

## 2022-07-11 VITALS
TEMPERATURE: 97 F | SYSTOLIC BLOOD PRESSURE: 118 MMHG | RESPIRATION RATE: 12 BRPM | HEART RATE: 80 BPM | WEIGHT: 162.38 LBS | BODY MASS INDEX: 29.13 KG/M2 | HEIGHT: 62.5 IN | DIASTOLIC BLOOD PRESSURE: 54 MMHG

## 2022-07-11 DIAGNOSIS — E78.5 HYPERLIPIDEMIA, UNSPECIFIED HYPERLIPIDEMIA TYPE: ICD-10-CM

## 2022-07-11 DIAGNOSIS — Z00.00 MEDICARE ANNUAL WELLNESS VISIT, SUBSEQUENT: Primary | ICD-10-CM

## 2022-07-11 DIAGNOSIS — E03.9 ACQUIRED HYPOTHYROIDISM: ICD-10-CM

## 2022-07-11 DIAGNOSIS — H35.3230 BILATERAL EXUDATIVE AGE-RELATED MACULAR DEGENERATION, UNSPECIFIED STAGE (HCC): ICD-10-CM

## 2022-07-11 DIAGNOSIS — C21.0 ANAL SQUAMOUS CELL CARCINOMA (HCC): ICD-10-CM

## 2022-07-11 DIAGNOSIS — G20 PARKINSON'S DISEASE (HCC): ICD-10-CM

## 2022-07-11 LAB
ALBUMIN SERPL-MCNC: 3.3 G/DL (ref 3.4–5)
ALBUMIN/GLOB SERPL: 1.1 {RATIO} (ref 1–2)
ALP LIVER SERPL-CCNC: 44 U/L
ALT SERPL-CCNC: 8 U/L
ANION GAP SERPL CALC-SCNC: 5 MMOL/L (ref 0–18)
AST SERPL-CCNC: 13 U/L (ref 15–37)
BILIRUB SERPL-MCNC: 0.3 MG/DL (ref 0.1–2)
BUN BLD-MCNC: 16 MG/DL (ref 7–18)
CALCIUM BLD-MCNC: 8.6 MG/DL (ref 8.5–10.1)
CHLORIDE SERPL-SCNC: 111 MMOL/L (ref 98–112)
CHOLEST SERPL-MCNC: 148 MG/DL (ref ?–200)
CO2 SERPL-SCNC: 27 MMOL/L (ref 21–32)
CREAT BLD-MCNC: 0.78 MG/DL
GLOBULIN PLAS-MCNC: 3 G/DL (ref 2.8–4.4)
GLUCOSE BLD-MCNC: 101 MG/DL (ref 70–99)
HDLC SERPL-MCNC: 64 MG/DL (ref 40–59)
LDLC SERPL CALC-MCNC: 64 MG/DL (ref ?–100)
NONHDLC SERPL-MCNC: 84 MG/DL (ref ?–130)
OSMOLALITY SERPL CALC.SUM OF ELEC: 297 MOSM/KG (ref 275–295)
POTASSIUM SERPL-SCNC: 3.7 MMOL/L (ref 3.5–5.1)
PROT SERPL-MCNC: 6.3 G/DL (ref 6.4–8.2)
SODIUM SERPL-SCNC: 143 MMOL/L (ref 136–145)
TRIGL SERPL-MCNC: 112 MG/DL (ref 30–149)
TSI SER-ACNC: 0.23 MIU/ML (ref 0.36–3.74)
VLDLC SERPL CALC-MCNC: 17 MG/DL (ref 0–30)

## 2022-07-11 PROCEDURE — 36415 COLL VENOUS BLD VENIPUNCTURE: CPT

## 2022-07-11 PROCEDURE — 80053 COMPREHEN METABOLIC PANEL: CPT

## 2022-07-11 PROCEDURE — 84443 ASSAY THYROID STIM HORMONE: CPT

## 2022-07-11 PROCEDURE — 80061 LIPID PANEL: CPT

## 2022-07-11 PROCEDURE — 1126F AMNT PAIN NOTED NONE PRSNT: CPT | Performed by: FAMILY MEDICINE

## 2022-07-11 PROCEDURE — G0439 PPPS, SUBSEQ VISIT: HCPCS | Performed by: FAMILY MEDICINE

## 2022-09-12 RX ORDER — SIMVASTATIN 20 MG
20 TABLET ORAL NIGHTLY
Qty: 90 TABLET | Refills: 0 | Status: SHIPPED | OUTPATIENT
Start: 2022-09-12

## 2022-09-15 ENCOUNTER — TELEPHONE (OUTPATIENT)
Dept: FAMILY MEDICINE CLINIC | Facility: CLINIC | Age: 87
End: 2022-09-15

## 2022-09-15 DIAGNOSIS — E03.9 ACQUIRED HYPOTHYROIDISM: Primary | ICD-10-CM

## 2022-09-15 NOTE — TELEPHONE ENCOUNTER
Patient states she had a dose adjustment on 7/11/22. She wanted to know what to do since she will be out of the script next month. Advised she needs blood work done around 10/22/22 to have her thyroid rechecked. She verbalized understanding.

## 2022-09-22 ENCOUNTER — TELEPHONE (OUTPATIENT)
Dept: FAMILY MEDICINE CLINIC | Facility: CLINIC | Age: 87
End: 2022-09-22

## 2022-09-22 NOTE — TELEPHONE ENCOUNTER
Spoke with patient who states she fell getting out of her bathtub on Monday. She landed on her right hip and hit her head and should on the wall. She denies any extreme pain. 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 She denies any change in her vision but states she does have macular degeneration so it is hard to tell. She was alert and oriented x 3. Advised she might want to be seen to make sure no injury occurred. Recommended immediate care in [place] for evaluation. She states she will talk to her kids and see about getting one of them to give her a ride.

## 2022-09-22 NOTE — TELEPHONE ENCOUNTER
She fell getting out of the bath tub and fell on her hip, her head and shoulder hit the wall. She said her kids want her to be seen.   She wants to know your opinion

## 2022-10-06 ENCOUNTER — TELEPHONE (OUTPATIENT)
Dept: FAMILY MEDICINE CLINIC | Facility: CLINIC | Age: 87
End: 2022-10-06

## 2022-10-11 ENCOUNTER — IMMUNIZATION (OUTPATIENT)
Dept: FAMILY MEDICINE CLINIC | Facility: CLINIC | Age: 87
End: 2022-10-11
Payer: MEDICARE

## 2022-10-11 ENCOUNTER — LABORATORY ENCOUNTER (OUTPATIENT)
Dept: LAB | Age: 87
End: 2022-10-11
Attending: FAMILY MEDICINE
Payer: MEDICARE

## 2022-10-11 DIAGNOSIS — E03.9 ACQUIRED HYPOTHYROIDISM: ICD-10-CM

## 2022-10-11 DIAGNOSIS — Z23 NEED FOR VACCINATION: Primary | ICD-10-CM

## 2022-10-11 LAB — TSI SER-ACNC: 0.93 MIU/ML (ref 0.36–3.74)

## 2022-10-11 PROCEDURE — 90662 IIV NO PRSV INCREASED AG IM: CPT | Performed by: FAMILY MEDICINE

## 2022-10-11 PROCEDURE — G0008 ADMIN INFLUENZA VIRUS VAC: HCPCS | Performed by: FAMILY MEDICINE

## 2022-10-11 PROCEDURE — 36415 COLL VENOUS BLD VENIPUNCTURE: CPT

## 2022-10-11 PROCEDURE — 84443 ASSAY THYROID STIM HORMONE: CPT

## 2022-10-12 ENCOUNTER — TELEPHONE (OUTPATIENT)
Dept: FAMILY MEDICINE CLINIC | Facility: CLINIC | Age: 87
End: 2022-10-12

## 2022-10-12 DIAGNOSIS — E03.9 ACQUIRED HYPOTHYROIDISM: Primary | ICD-10-CM

## 2022-10-12 RX ORDER — LEVOTHYROXINE SODIUM 0.07 MG/1
75 TABLET ORAL
Qty: 90 TABLET | Refills: 1 | Status: SHIPPED | OUTPATIENT
Start: 2022-10-12 | End: 2023-04-10

## 2022-10-12 NOTE — TELEPHONE ENCOUNTER
Please advise regarding lab results. Pt wants to know if she should continue on the same levothyroxine dose, 75mcg.

## 2022-10-12 NOTE — TELEPHONE ENCOUNTER
Continue on same levothyroxine dose, 75mcg, per Dr. Richard Valderrama verbal recommendations. Script has been sent. Left detailed msg for pt advising to continue on same dose. Advised she will be notified when the rest of her results have been reviewed.

## 2022-10-12 NOTE — TELEPHONE ENCOUNTER
*Correction - a TSH was the only lab ordered. Pt was advised of her results and recommendations. She verbalized understanding.

## 2022-11-09 ENCOUNTER — OFFICE VISIT (OUTPATIENT)
Dept: FAMILY MEDICINE CLINIC | Facility: CLINIC | Age: 87
End: 2022-11-09
Payer: MEDICARE

## 2022-11-09 VITALS
BODY MASS INDEX: 28.71 KG/M2 | HEIGHT: 62.5 IN | TEMPERATURE: 98 F | HEART RATE: 75 BPM | RESPIRATION RATE: 12 BRPM | OXYGEN SATURATION: 97 % | DIASTOLIC BLOOD PRESSURE: 76 MMHG | WEIGHT: 160 LBS | SYSTOLIC BLOOD PRESSURE: 130 MMHG

## 2022-11-09 DIAGNOSIS — H92.03 EAR DISCOMFORT, BILATERAL: ICD-10-CM

## 2022-11-09 DIAGNOSIS — S40.012A CONTUSION OF LEFT SHOULDER, INITIAL ENCOUNTER: Primary | ICD-10-CM

## 2022-11-09 PROCEDURE — 99214 OFFICE O/P EST MOD 30 MIN: CPT | Performed by: FAMILY MEDICINE

## 2022-11-09 PROCEDURE — 1126F AMNT PAIN NOTED NONE PRSNT: CPT | Performed by: FAMILY MEDICINE

## 2022-12-10 RX ORDER — SIMVASTATIN 20 MG
TABLET ORAL
Qty: 90 TABLET | Refills: 0 | Status: SHIPPED | OUTPATIENT
Start: 2022-12-10

## 2022-12-10 NOTE — TELEPHONE ENCOUNTER
Cholesterol Medication Protocol Passed 12/09/2022 01:23 PM   Protocol Details  ALT < 80    ALT resulted within past year    Lipid panel within past 12 months    Appointment within past 12 or next 3 months        Last office visit:  11/09/22  Last refill:  09/12/22  #90, no refills  Last lipid:  07/11/22    Future Appointments   No future office visits with pcp.

## 2022-12-21 ENCOUNTER — OFFICE VISIT (OUTPATIENT)
Dept: NEUROLOGY | Facility: CLINIC | Age: 87
End: 2022-12-21
Payer: MEDICARE

## 2022-12-21 VITALS
HEART RATE: 76 BPM | DIASTOLIC BLOOD PRESSURE: 59 MMHG | SYSTOLIC BLOOD PRESSURE: 143 MMHG | RESPIRATION RATE: 16 BRPM | BODY MASS INDEX: 28 KG/M2 | WEIGHT: 156 LBS

## 2022-12-21 DIAGNOSIS — R41.3 MEMORY LOSS: ICD-10-CM

## 2022-12-21 DIAGNOSIS — G20 PARKINSON'S DISEASE (HCC): Primary | ICD-10-CM

## 2022-12-21 PROBLEM — G30.9 ALZHEIMER'S DISEASE, UNSPECIFIED (HCC): Status: ACTIVE | Noted: 2022-12-21

## 2022-12-21 PROBLEM — F02.80 ALZHEIMER'S DISEASE, UNSPECIFIED (HCC): Status: ACTIVE | Noted: 2022-12-21

## 2022-12-21 PROCEDURE — 99213 OFFICE O/P EST LOW 20 MIN: CPT | Performed by: OTHER

## 2022-12-21 RX ORDER — DONEPEZIL HYDROCHLORIDE 5 MG/1
5 TABLET, FILM COATED ORAL NIGHTLY
Qty: 30 TABLET | Refills: 2 | Status: SHIPPED | OUTPATIENT
Start: 2022-12-21

## 2023-02-21 ENCOUNTER — TELEPHONE (OUTPATIENT)
Dept: FAMILY MEDICINE CLINIC | Facility: CLINIC | Age: 88
End: 2023-02-21

## 2023-02-21 RX ORDER — MELOXICAM 15 MG/1
15 TABLET ORAL DAILY
Qty: 30 TABLET | Refills: 0 | Status: SHIPPED | OUTPATIENT
Start: 2023-02-21

## 2023-02-21 NOTE — TELEPHONE ENCOUNTER
She wanted to be seen for leg pain at night and back pain during the day. I scheduled her for 3-1-23 but she wanted to know if anything she could do before that appt. She is also asking if she is up to date on her covid vaccines?

## 2023-02-21 NOTE — TELEPHONE ENCOUNTER
Phone call from patient. Has an appointment on 3/1/23 for back pain and leg pain in the am.  States both legs are very painful when she gets up. Pain is from the knee down. No swelling. Slight discoloration in the ankles. Better when she is up and around. Taking Tylenol ES. Helps with pain. Has lower back pain all of the time.

## 2023-02-21 NOTE — TELEPHONE ENCOUNTER
Per Dr. Brown - Meloxicam 15 mg daily #30 - v.o. Dr. Kimber Day. Patient advised - aware that she cannot take with other NSAID's. Needs to take with food.

## 2023-03-01 ENCOUNTER — OFFICE VISIT (OUTPATIENT)
Dept: FAMILY MEDICINE CLINIC | Facility: CLINIC | Age: 88
End: 2023-03-01
Payer: MEDICARE

## 2023-03-01 VITALS
WEIGHT: 157 LBS | SYSTOLIC BLOOD PRESSURE: 134 MMHG | RESPIRATION RATE: 12 BRPM | HEART RATE: 72 BPM | BODY MASS INDEX: 28.17 KG/M2 | DIASTOLIC BLOOD PRESSURE: 62 MMHG | HEIGHT: 62.5 IN | OXYGEN SATURATION: 98 % | TEMPERATURE: 98 F

## 2023-03-01 DIAGNOSIS — E03.9 ACQUIRED HYPOTHYROIDISM: ICD-10-CM

## 2023-03-01 DIAGNOSIS — G30.9 ALZHEIMER'S DISEASE, UNSPECIFIED (HCC): ICD-10-CM

## 2023-03-01 DIAGNOSIS — Z79.899 ENCOUNTER FOR LONG-TERM (CURRENT) USE OF MEDICATIONS: Primary | ICD-10-CM

## 2023-03-01 DIAGNOSIS — H35.3230 BILATERAL EXUDATIVE AGE-RELATED MACULAR DEGENERATION, UNSPECIFIED STAGE (HCC): ICD-10-CM

## 2023-03-01 DIAGNOSIS — F02.80 ALZHEIMER'S DISEASE, UNSPECIFIED (HCC): ICD-10-CM

## 2023-03-01 DIAGNOSIS — G20 PARKINSON'S DISEASE (HCC): ICD-10-CM

## 2023-03-01 DIAGNOSIS — E78.5 HYPERLIPIDEMIA, UNSPECIFIED HYPERLIPIDEMIA TYPE: ICD-10-CM

## 2023-03-01 PROCEDURE — 1125F AMNT PAIN NOTED PAIN PRSNT: CPT | Performed by: FAMILY MEDICINE

## 2023-03-01 PROCEDURE — 99214 OFFICE O/P EST MOD 30 MIN: CPT | Performed by: FAMILY MEDICINE

## 2023-03-06 RX ORDER — SIMVASTATIN 20 MG
TABLET ORAL
Qty: 90 TABLET | Refills: 0 | Status: SHIPPED | OUTPATIENT
Start: 2023-03-06

## 2023-03-06 NOTE — TELEPHONE ENCOUNTER
LOV  3-1-23    LAST LAB 7-11-22 Chem Profile ALT 8                  7-11-22 Lipids    LAST RX  12-10-22 #90    Next OV    Future Appointments   Date Time Provider Corona Olivo   6/28/2023  1:00 PM DO PERNELL Parker         PROTOCOL  Cholesterol Medication Protocol Passed 03/06/2023 10:22 AM   Protocol Details  ALT < 80    ALT resulted within past year    Lipid panel within past 12 months    Appointment within past 12 or next 3 months

## 2023-03-18 DIAGNOSIS — R41.3 MEMORY LOSS: Primary | ICD-10-CM

## 2023-03-20 RX ORDER — DONEPEZIL HYDROCHLORIDE 5 MG/1
TABLET, FILM COATED ORAL
Qty: 90 TABLET | Refills: 0 | Status: SHIPPED | OUTPATIENT
Start: 2023-03-20

## 2023-03-27 RX ORDER — MELOXICAM 15 MG/1
15 TABLET ORAL DAILY
Qty: 30 TABLET | Refills: 0 | Status: SHIPPED | OUTPATIENT
Start: 2023-03-27

## 2023-03-27 NOTE — TELEPHONE ENCOUNTER
PT NEEDS REFILL ON-  Meloxicam 15 MG Oral Tab      [address] [facility] [address], [address] [place] [address] [phone], [address]

## 2023-04-03 RX ORDER — LEVOTHYROXINE SODIUM 0.07 MG/1
75 TABLET ORAL
Qty: 30 TABLET | Refills: 0 | Status: SHIPPED | OUTPATIENT
Start: 2023-04-03 | End: 2023-09-30

## 2023-04-03 NOTE — TELEPHONE ENCOUNTER
Thyroid Supplements Protocol Passed 04/03/2023 10:25 AM   Protocol Details  TSH test in past 12 months    TSH value between 0.350 and 5.500 IU/ml    Appointment in past 12 or next 3 months     Last refill - 10/12/22 - #90 with 1 refill  Last TSH - 10/11/22 - 0.933  Last office visit - 3/1/23  Refilled per protocol

## 2023-04-17 ENCOUNTER — TELEPHONE (OUTPATIENT)
Dept: FAMILY MEDICINE CLINIC | Facility: CLINIC | Age: 88
End: 2023-04-17

## 2023-04-17 RX ORDER — MELOXICAM 15 MG/1
15 TABLET ORAL DAILY
Qty: 30 TABLET | Refills: 2 | Status: SHIPPED | OUTPATIENT
Start: 2023-04-17

## 2023-04-17 NOTE — TELEPHONE ENCOUNTER
SHE IS ASKING IF DR. HARLEY STILL WANTS HER TO TAKE Meloxicam 15 MG Oral Tab?  SHE SAID IT SEEMS TO BE HELPING, IF SO SHE NEEDS A REFILL CALLED [facility]

## 2023-05-01 RX ORDER — LEVOTHYROXINE SODIUM 0.07 MG/1
TABLET ORAL
Qty: 30 TABLET | Refills: 0 | Status: SHIPPED | OUTPATIENT
Start: 2023-05-01

## 2023-05-25 NOTE — TELEPHONE ENCOUNTER
Levothyroxine 75 MCG oral tab     Thyroid Supplements Protocol Passed 05/25/2023 03:44 PM   Protocol Details  TSH test in past 12 months    TSH value between 0.350 and 5.500 IU/ml    Appointment in past 12 or next 3 months        Last office visit:  3/1/23    Future Appointments   Date Time Provider Corona Olivo   6/28/2023  1:00 PM DO PERNELL Charles [facility]   7/10/2023 10:20 AM Suzie Brown MD [facility] [place]     Last filled:  5/1/23  #30 with 0 refills   Last labs:   10/11/22  TSH: 0.933    Called and spoke with Juan David Morel she only has 7 pill left and she will have to find a ride her. She said that she would try and find a ride for tomorrow.

## 2023-05-26 ENCOUNTER — LABORATORY ENCOUNTER (OUTPATIENT)
Dept: LAB | Age: 88
End: 2023-05-26
Attending: FAMILY MEDICINE
Payer: MEDICARE

## 2023-05-26 DIAGNOSIS — E03.9 ACQUIRED HYPOTHYROIDISM: ICD-10-CM

## 2023-05-26 LAB
T4 FREE SERPL-MCNC: 1.4 NG/DL (ref 0.8–1.7)
TSI SER-ACNC: 0.17 MIU/ML (ref 0.36–3.74)

## 2023-05-26 PROCEDURE — 84443 ASSAY THYROID STIM HORMONE: CPT

## 2023-05-26 PROCEDURE — 36415 COLL VENOUS BLD VENIPUNCTURE: CPT

## 2023-05-26 PROCEDURE — 84439 ASSAY OF FREE THYROXINE: CPT

## 2023-05-26 RX ORDER — LEVOTHYROXINE SODIUM 0.07 MG/1
TABLET ORAL
Qty: 30 TABLET | Refills: 1 | Status: SHIPPED | OUTPATIENT
Start: 2023-05-26

## 2023-06-05 RX ORDER — SIMVASTATIN 20 MG
TABLET ORAL
Qty: 90 TABLET | Refills: 0 | Status: SHIPPED | OUTPATIENT
Start: 2023-06-05

## 2023-06-05 NOTE — TELEPHONE ENCOUNTER
Simvastatin 20 MG oral tab     Cholesterol Medication Protocol Passed 06/05/2023 10:25 AM   Protocol Details  ALT < 80    ALT resulted within past year    Lipid panel within past 12 months    Appointment within past 12 or next 3 months        Last office visit:  3/1/23    Future Appointments   Date Time Provider Corona Olivo   6/28/2023  1:00 PM DO PERNELL Guadarrama [facility]   7/10/2023 10:20 AM Shane Brown MD [facility] [place]   Last filled:  3/6/23  #90 with 0 refills   Last labs: 7/11/22  ALT:  8

## 2023-06-18 DIAGNOSIS — R41.3 MEMORY LOSS: ICD-10-CM

## 2023-06-19 RX ORDER — DONEPEZIL HYDROCHLORIDE 5 MG/1
TABLET, FILM COATED ORAL
Qty: 90 TABLET | Refills: 0 | Status: SHIPPED | OUTPATIENT
Start: 2023-06-19

## 2023-06-19 NOTE — TELEPHONE ENCOUNTER
Medication: DONEPEZIL 5 MG Oral Tab     Date of last refill: 03/20/23 (90/0)  [place] 93/16[place]33    Last office visit: 12/21/22  Due back to clinic per last office note:  6 months  Date next office visit scheduled:    Future Appointments   Date Time Provider Corona Pallavi   6/28/2023  1:00 PM DO PERNELL Mendoza [facility]   7/10/2023 10:20 AM Trey Brown MD [facility] [place]        Last OV note recommendation:     Plan:  1. Tremor  - Parkinson's disease    - Sinemet 25/100 TID     2.  Memory loss  - Start donepezil 5mg PO Qday     RTC 6 months

## 2023-06-28 ENCOUNTER — OFFICE VISIT (OUTPATIENT)
Dept: NEUROLOGY | Facility: CLINIC | Age: 88
End: 2023-06-28
Payer: MEDICARE

## 2023-06-28 VITALS
HEART RATE: 72 BPM | WEIGHT: 152 LBS | DIASTOLIC BLOOD PRESSURE: 68 MMHG | BODY MASS INDEX: 27 KG/M2 | SYSTOLIC BLOOD PRESSURE: 129 MMHG | RESPIRATION RATE: 16 BRPM

## 2023-06-28 DIAGNOSIS — G20 PARKINSON'S DISEASE (HCC): Primary | ICD-10-CM

## 2023-06-28 PROCEDURE — 99213 OFFICE O/P EST LOW 20 MIN: CPT | Performed by: OTHER

## 2023-06-28 RX ORDER — CHOLECALCIFEROL (VITAMIN D3) 10(400)/ML
DROPS ORAL AS DIRECTED
COMMUNITY
End: 2023-06-28

## 2023-06-28 RX ORDER — MULTIVIT-MIN/IRON/FOLIC ACID/K 18-600-40
CAPSULE ORAL
COMMUNITY

## 2023-07-10 ENCOUNTER — OFFICE VISIT (OUTPATIENT)
Dept: FAMILY MEDICINE CLINIC | Facility: CLINIC | Age: 88
End: 2023-07-10
Payer: MEDICARE

## 2023-07-10 ENCOUNTER — LABORATORY ENCOUNTER (OUTPATIENT)
Dept: LAB | Age: 88
End: 2023-07-10
Attending: FAMILY MEDICINE
Payer: MEDICARE

## 2023-07-10 VITALS
RESPIRATION RATE: 12 BRPM | SYSTOLIC BLOOD PRESSURE: 130 MMHG | DIASTOLIC BLOOD PRESSURE: 60 MMHG | BODY MASS INDEX: 27.31 KG/M2 | WEIGHT: 154.13 LBS | TEMPERATURE: 98 F | HEART RATE: 67 BPM | OXYGEN SATURATION: 98 % | HEIGHT: 63 IN

## 2023-07-10 DIAGNOSIS — G20 PARKINSON'S DISEASE (HCC): ICD-10-CM

## 2023-07-10 DIAGNOSIS — E03.9 ACQUIRED HYPOTHYROIDISM: ICD-10-CM

## 2023-07-10 DIAGNOSIS — Z79.899 ENCOUNTER FOR LONG-TERM (CURRENT) USE OF MEDICATIONS: ICD-10-CM

## 2023-07-10 DIAGNOSIS — F02.80 ALZHEIMER'S DISEASE, UNSPECIFIED (HCC): ICD-10-CM

## 2023-07-10 DIAGNOSIS — Z00.00 MEDICARE ANNUAL WELLNESS VISIT, SUBSEQUENT: Primary | ICD-10-CM

## 2023-07-10 DIAGNOSIS — R41.3 MEMORY LOSS: ICD-10-CM

## 2023-07-10 DIAGNOSIS — H35.3230 BILATERAL EXUDATIVE AGE-RELATED MACULAR DEGENERATION, UNSPECIFIED STAGE (HCC): ICD-10-CM

## 2023-07-10 DIAGNOSIS — E78.5 HYPERLIPIDEMIA, UNSPECIFIED HYPERLIPIDEMIA TYPE: ICD-10-CM

## 2023-07-10 DIAGNOSIS — G30.9 ALZHEIMER'S DISEASE, UNSPECIFIED (HCC): ICD-10-CM

## 2023-07-10 LAB
ALBUMIN SERPL-MCNC: 3.6 G/DL (ref 3.4–5)
ALBUMIN/GLOB SERPL: 1.2 {RATIO} (ref 1–2)
ALP LIVER SERPL-CCNC: 33 U/L
ALT SERPL-CCNC: 7 U/L
ANION GAP SERPL CALC-SCNC: 5 MMOL/L (ref 0–18)
AST SERPL-CCNC: 13 U/L (ref 15–37)
BILIRUB SERPL-MCNC: 0.4 MG/DL (ref 0.1–2)
BUN BLD-MCNC: 26 MG/DL (ref 7–18)
CALCIUM BLD-MCNC: 8.7 MG/DL (ref 8.5–10.1)
CHLORIDE SERPL-SCNC: 111 MMOL/L (ref 98–112)
CHOLEST SERPL-MCNC: 192 MG/DL (ref ?–200)
CO2 SERPL-SCNC: 27 MMOL/L (ref 21–32)
CREAT BLD-MCNC: 0.86 MG/DL
GFR SERPLBLD BASED ON 1.73 SQ M-ARVRAT: 63 ML/MIN/1.73M2 (ref 60–?)
GLOBULIN PLAS-MCNC: 2.9 G/DL (ref 2.8–4.4)
GLUCOSE BLD-MCNC: 97 MG/DL (ref 70–99)
HDLC SERPL-MCNC: 83 MG/DL (ref 40–59)
LDLC SERPL CALC-MCNC: 91 MG/DL (ref ?–100)
NONHDLC SERPL-MCNC: 109 MG/DL (ref ?–130)
OSMOLALITY SERPL CALC.SUM OF ELEC: 301 MOSM/KG (ref 275–295)
POTASSIUM SERPL-SCNC: 4.2 MMOL/L (ref 3.5–5.1)
PROT SERPL-MCNC: 6.5 G/DL (ref 6.4–8.2)
SODIUM SERPL-SCNC: 143 MMOL/L (ref 136–145)
TRIGL SERPL-MCNC: 103 MG/DL (ref 30–149)
VLDLC SERPL CALC-MCNC: 17 MG/DL (ref 0–30)

## 2023-07-10 PROCEDURE — G0439 PPPS, SUBSEQ VISIT: HCPCS | Performed by: FAMILY MEDICINE

## 2023-07-10 PROCEDURE — 99214 OFFICE O/P EST MOD 30 MIN: CPT | Performed by: FAMILY MEDICINE

## 2023-07-10 PROCEDURE — 1126F AMNT PAIN NOTED NONE PRSNT: CPT | Performed by: FAMILY MEDICINE

## 2023-07-10 RX ORDER — ZOSTER VACCINE RECOMBINANT, ADJUVANTED 50 MCG/0.5
50 KIT INTRAMUSCULAR ONCE
Qty: 2 EACH | Refills: 0 | Status: CANCELLED | OUTPATIENT
Start: 2023-07-10 | End: 2023-07-10

## 2023-07-17 RX ORDER — MELOXICAM 15 MG/1
15 TABLET ORAL DAILY
Qty: 30 TABLET | Refills: 2 | Status: SHIPPED | OUTPATIENT
Start: 2023-07-17

## 2023-07-17 RX ORDER — MELOXICAM 15 MG/1
15 TABLET ORAL DAILY
Qty: 30 TABLET | Refills: 0 | OUTPATIENT
Start: 2023-07-17

## 2023-07-17 NOTE — TELEPHONE ENCOUNTER
LOV:7/10-23    LAST LAB: 7-10-23    LAST RX:  Medication Quantity Refills Start End   Meloxicam 15 MG Oral Tab 30 tablet 2 4/17/2023    Sig:   Take 1 tablet (15 mg total) by mouth daily.          Next OV:   Future Appointments   Date Time Provider Corona Olivo   12/8/2023  1:20 PM DO PERNELL [place]

## 2023-07-17 NOTE — ASSESSMENT & PLAN NOTE
Cholesterol shows Good control. Long term heart-healthy diet and lifestyle discussed and encouraged to reduce risk of cardiovascular disease. Cholesterol: 192, done on 7/10/2023. HDL Cholesterol: 83, done on 7/10/2023. TriGlycerides 103, done on 7/10/2023. LDL Cholesterol: 91, done on 7/10/2023. Cholesterol medications include SIMVASTATIN 20 MG Oral Tab [[id number]].

## 2023-07-17 NOTE — ASSESSMENT & PLAN NOTE
Thyroid shows Good control. TSH: 0.167, done on 5/26/2023. FT4: 1.4, done on 5/26/2023. Thyroid therapy includes LEVOTHYROXINE 75 MCG Oral Tab [[id number]].

## 2023-07-18 DIAGNOSIS — E78.5 HYPERLIPIDEMIA, UNSPECIFIED HYPERLIPIDEMIA TYPE: Primary | ICD-10-CM

## 2023-07-24 RX ORDER — LEVOTHYROXINE SODIUM 0.07 MG/1
TABLET ORAL
Qty: 30 TABLET | Refills: 3 | Status: SHIPPED | OUTPATIENT
Start: 2023-07-24

## 2023-07-24 NOTE — TELEPHONE ENCOUNTER
Thyroid Supplements Protocol [id number]/23/2023 09:26 AM   Protocol Details TSH value between 0.350 and 5.500 IU/ml    TSH test in past 12 months    Appointment in past 12 or next 3 months     Last refill - 5/26/23 - #30 with 1 refill  Last TSH - 5/26/23 - 0.167 - to recheck in 6 months  Last office visit - 7/10/23  Refilled per protocol

## 2023-09-10 DIAGNOSIS — R41.3 MEMORY LOSS: ICD-10-CM

## 2023-09-11 RX ORDER — DONEPEZIL HYDROCHLORIDE 5 MG/1
5 TABLET, FILM COATED ORAL NIGHTLY
Qty: 90 TABLET | Refills: 0 | Status: SHIPPED | OUTPATIENT
Start: 2023-09-11

## 2023-09-11 RX ORDER — SIMVASTATIN 20 MG
TABLET ORAL
Qty: 90 TABLET | Refills: 0 | Status: SHIPPED | OUTPATIENT
Start: 2023-09-11

## 2023-09-11 NOTE — TELEPHONE ENCOUNTER
Last refill: 06/05/23  Qty: 90  W/ 0 refills  Last ov: 07/10/23    Requested Prescriptions     Pending Prescriptions Disp Refills    SIMVASTATIN 20 MG Oral Tab [Pharmacy Med Name: Simvastatin 20 MG Oral Tablet] 90 tablet 0     Sig: Take 1 tablet by mouth nightly     Future Appointments   Date Time Provider Corona Olivo   12/8/2023  1:20 PM Tama Dare, DO [facility]

## 2023-09-11 NOTE — TELEPHONE ENCOUNTER
Medication: DONEPEZIL 5 MG Oral Tab     Date of last refill: 06/19/23 (90/0)  [place] 42/12/28    Last office visit: 06/28/23  Due back to clinic per last office note:  6 months  Date next office visit scheduled:    Future Appointments   Date Time Provider Corona Olivo   12/8/2023  1:20 PM DO PERNELL Gomez [id number] note recommendation:        Plan:  1. Tremor  - Parkinson's disease    - Sinemet 25/100 TID     2.  Memory loss  -  Donepezil 5mg PO Qday     RTC 6 months

## 2023-10-30 ENCOUNTER — TELEPHONE (OUTPATIENT)
Dept: FAMILY MEDICINE CLINIC | Facility: CLINIC | Age: 88
End: 2023-10-30

## 2023-10-30 RX ORDER — MELOXICAM 15 MG/1
15 TABLET ORAL DAILY
Qty: 30 TABLET | Refills: 0 | Status: SHIPPED | OUTPATIENT
Start: 2023-10-30

## 2023-10-30 RX ORDER — MELOXICAM 15 MG/1
15 TABLET ORAL DAILY
Qty: 30 TABLET | Refills: 0 | OUTPATIENT
Start: 2023-10-30

## 2023-10-30 NOTE — TELEPHONE ENCOUNTER
Last refill: 07/17/23  Qty: 30  W/ 2 refills  Last ov: 07/10/23    Requested Prescriptions     Pending Prescriptions Disp Refills    MELOXICAM 15 MG Oral Tab [Pharmacy Med Name: Meloxicam 15 MG Oral Tablet] 30 tablet 0     Sig: Take 1 tablet by mouth once daily     Future Appointments   Date Time Provider Corona Olivo   12/8/2023  1:20 PM DO PERNELL [facility]

## 2023-10-30 NOTE — TELEPHONE ENCOUNTER
Patient requesting refill on Meloxicam    LOV  7-10-23    LAST LAB  7-10-23    LAST RX  10-30-23  #30    Next OV    Future Appointments   Date Time Provider Corona Olivo   12/8/2023  1:20 PM DO PERNELL Parker           See other encounter.

## 2023-11-02 ENCOUNTER — TELEPHONE (OUTPATIENT)
Dept: FAMILY MEDICINE CLINIC | Facility: CLINIC | Age: 88
End: 2023-11-02

## 2023-11-02 NOTE — TELEPHONE ENCOUNTER
Spoke with patient and she verbalized understanding. She is going to try and find a ride, and then will make an appointment.

## 2023-11-06 ENCOUNTER — TELEPHONE (OUTPATIENT)
Dept: FAMILY MEDICINE CLINIC | Facility: CLINIC | Age: 88
End: 2023-11-06

## 2023-11-06 NOTE — TELEPHONE ENCOUNTER
Last OV 7-10-23    Appointment scheduled:  Future Appointments   Date Time Provider Corona Olivo   11/11/2023  9:15 AM EM[facility] [place]   12/8/2023  1:20 PM DO PERNELL Agustin [facility]

## 2023-11-11 ENCOUNTER — IMMUNIZATION (OUTPATIENT)
Dept: FAMILY MEDICINE CLINIC | Facility: CLINIC | Age: 88
End: 2023-11-11
Payer: MEDICARE

## 2023-11-11 DIAGNOSIS — Z23 NEED FOR VACCINATION: Primary | ICD-10-CM

## 2023-11-11 PROCEDURE — G0008 ADMIN INFLUENZA VIRUS VAC: HCPCS | Performed by: FAMILY MEDICINE

## 2023-11-11 PROCEDURE — 90662 IIV NO PRSV INCREASED AG IM: CPT | Performed by: FAMILY MEDICINE

## 2023-11-21 RX ORDER — LEVOTHYROXINE SODIUM 0.07 MG/1
TABLET ORAL
Qty: 30 TABLET | Refills: 0 | Status: SHIPPED | OUTPATIENT
Start: 2023-11-21

## 2023-11-21 NOTE — TELEPHONE ENCOUNTER
OV 07/10/23  REFILL 07/24  LABS 05/26 DUE FOR REPEAT    Requested Prescriptions     Pending Prescriptions Disp Refills    LEVOTHYROXINE 75 MCG Oral Tab [Pharmacy Med Name: Levothyroxine Sodium 75 MCG Oral Tablet] 30 tablet 0     Sig: TAKE 1 TABLET BY MOUTH ONCE DAILY BEFORE BREAKFAST     Future Appointments   Date Time Provider Corona Olivo   12/8/2023  1:20 PM DO PERNELL Mullins

## 2023-12-06 RX ORDER — SIMVASTATIN 20 MG
TABLET ORAL
Qty: 90 TABLET | Refills: 0 | Status: SHIPPED | OUTPATIENT
Start: 2023-12-06

## 2023-12-06 NOTE — TELEPHONE ENCOUNTER
Last refill: 09/11/23  Qty: 90  W/ 0 refills  Last ov: 07/10/23    Requested Prescriptions     Pending Prescriptions Disp Refills    SIMVASTATIN 20 MG Oral Tab [Pharmacy Med Name: Simvastatin 20 MG Oral Tablet] 90 tablet 0     Sig: Take 1 tablet by mouth nightly     Future Appointments   Date Time Provider Corona Olivo   12/8/2023  1:20 PM DO PERNELL Gomez

## 2023-12-07 ENCOUNTER — TELEPHONE (OUTPATIENT)
Dept: NEUROLOGY | Facility: CLINIC | Age: 88
End: 2023-12-07

## 2023-12-07 NOTE — TELEPHONE ENCOUNTER
Called patient and informed her that appt tomorrow in [place] will need to be cancelled. Pt r/s to 1/24/2024 in [place] at 2:40 pm.    Routed to Supervisor to [employer].

## 2023-12-12 DIAGNOSIS — R41.3 MEMORY LOSS: ICD-10-CM

## 2023-12-12 NOTE — TELEPHONE ENCOUNTER
Medication: DONEPEZIL 5 MG Oral Tab      Date of last refill: 09/11/2023 (#90/0)  [place]     Last office visit: 06/28/2023  Due back to clinic per last office note:  Around 12/28/2023  Date next office visit scheduled:    Future Appointments   Date Time Provider Corona Pallavi   1/24/2024  2:40 PM DO PERNELL Anglin  [address] note recommendation:    Assessment: This is an 81 y/o female with Parkinsons disease. She is doing well on Sinemet 25/100mg TID. She should take this at 8am noon and 4pm. I will continue donepezil 5mg PO Qday for her memory loss. She has no hallucinations or mood or behavioral changes Seems mild. Maybe Alzheimers or dementia asociated with PD but does not sound like LBD at this time        Plan:  1. Tremor  - Parkinson's disease    - Sinemet 25/100 TID     2.  Memory loss  -  Donepezil 5mg PO Qday     RTC 6 months     Anni Sethi DO  Neurology

## 2023-12-13 RX ORDER — DONEPEZIL HYDROCHLORIDE 5 MG/1
5 TABLET, FILM COATED ORAL NIGHTLY
Qty: 90 TABLET | Refills: 0 | Status: SHIPPED | OUTPATIENT
Start: 2023-12-13

## 2023-12-18 DIAGNOSIS — E03.9 ACQUIRED HYPOTHYROIDISM: Primary | ICD-10-CM

## 2023-12-18 RX ORDER — LEVOTHYROXINE SODIUM 0.07 MG/1
TABLET ORAL
Qty: 30 TABLET | Refills: 0 | Status: SHIPPED | OUTPATIENT
Start: 2023-12-18

## 2023-12-18 NOTE — TELEPHONE ENCOUNTER
Thyroid Supplements Protocol Itnovu3812/18/2023 09:51 AM   Protocol Details TSH value between 0.350 and 5.500 IU/ml    TSH test in past 12 months    Appointment in past 12 or next 3 months     Last refill - 11/21/23 - #30   Last TSH - 5/26/23 - 0.167  Last office visit - 7/10/23  Patient advised due for labs for thyroid. Patient is checking to see if she can get a ride and will schedule.

## 2023-12-21 ENCOUNTER — LABORATORY ENCOUNTER (OUTPATIENT)
Dept: LAB | Age: 88
End: 2023-12-21
Attending: FAMILY MEDICINE
Payer: MEDICARE

## 2023-12-21 DIAGNOSIS — E03.9 ACQUIRED HYPOTHYROIDISM: ICD-10-CM

## 2023-12-21 DIAGNOSIS — E78.5 HYPERLIPIDEMIA, UNSPECIFIED HYPERLIPIDEMIA TYPE: ICD-10-CM

## 2023-12-21 LAB
ALBUMIN SERPL-MCNC: 3.6 G/DL (ref 3.4–5)
ALBUMIN/GLOB SERPL: 1.2 {RATIO} (ref 1–2)
ALP LIVER SERPL-CCNC: 37 U/L
ALT SERPL-CCNC: 8 U/L
ANION GAP SERPL CALC-SCNC: 6 MMOL/L (ref 0–18)
AST SERPL-CCNC: 12 U/L (ref 15–37)
BILIRUB SERPL-MCNC: 0.4 MG/DL (ref 0.1–2)
BUN BLD-MCNC: 21 MG/DL (ref 9–23)
CALCIUM BLD-MCNC: 8.9 MG/DL (ref 8.5–10.1)
CHLORIDE SERPL-SCNC: 110 MMOL/L (ref 98–112)
CHOLEST SERPL-MCNC: 175 MG/DL (ref ?–200)
CO2 SERPL-SCNC: 27 MMOL/L (ref 21–32)
CREAT BLD-MCNC: 1.11 MG/DL
EGFRCR SERPLBLD CKD-EPI 2021: 46 ML/MIN/1.73M2 (ref 60–?)
FASTING PATIENT LIPID ANSWER: NO
FASTING STATUS PATIENT QL REPORTED: NO
GLOBULIN PLAS-MCNC: 2.9 G/DL (ref 2.8–4.4)
GLUCOSE BLD-MCNC: 123 MG/DL (ref 70–99)
HDLC SERPL-MCNC: 90 MG/DL (ref 40–59)
LDLC SERPL CALC-MCNC: 70 MG/DL (ref ?–100)
NONHDLC SERPL-MCNC: 85 MG/DL (ref ?–130)
OSMOLALITY SERPL CALC.SUM OF ELEC: 300 MOSM/KG (ref 275–295)
POTASSIUM SERPL-SCNC: 3.9 MMOL/L (ref 3.5–5.1)
PROT SERPL-MCNC: 6.5 G/DL (ref 6.4–8.2)
SODIUM SERPL-SCNC: 143 MMOL/L (ref 136–145)
T4 FREE SERPL-MCNC: 1.2 NG/DL (ref 0.8–1.7)
TRIGL SERPL-MCNC: 80 MG/DL (ref 30–149)
TSI SER-ACNC: 0.42 MIU/ML (ref 0.36–3.74)
VLDLC SERPL CALC-MCNC: 12 MG/DL (ref 0–30)

## 2023-12-21 PROCEDURE — 84439 ASSAY OF FREE THYROXINE: CPT

## 2023-12-21 PROCEDURE — 84443 ASSAY THYROID STIM HORMONE: CPT

## 2023-12-21 PROCEDURE — 80061 LIPID PANEL: CPT

## 2023-12-21 PROCEDURE — 36415 COLL VENOUS BLD VENIPUNCTURE: CPT

## 2023-12-21 PROCEDURE — 80053 COMPREHEN METABOLIC PANEL: CPT

## 2024-01-02 DIAGNOSIS — E03.9 ACQUIRED HYPOTHYROIDISM: Primary | ICD-10-CM

## 2024-01-02 DIAGNOSIS — E78.5 HYPERLIPIDEMIA, UNSPECIFIED HYPERLIPIDEMIA TYPE: ICD-10-CM

## 2024-01-02 DIAGNOSIS — Z79.899 ENCOUNTER FOR LONG-TERM (CURRENT) USE OF MEDICATIONS: ICD-10-CM

## 2024-01-15 DIAGNOSIS — E03.9 ACQUIRED HYPOTHYROIDISM: ICD-10-CM

## 2024-01-15 RX ORDER — LEVOTHYROXINE SODIUM 0.07 MG/1
TABLET ORAL
Qty: 90 TABLET | Refills: 0 | Status: SHIPPED | OUTPATIENT
Start: 2024-01-15

## 2024-01-15 NOTE — TELEPHONE ENCOUNTER
OV 07/10/23  LABS 12/2023    REFILL 12/18 #30     Future Appointments   Date Time Provider Department Center   1/24/2024  2:40 PM Terry Gudino DO [facility] [place]

## 2024-03-04 RX ORDER — SIMVASTATIN 20 MG
TABLET ORAL
Qty: 90 TABLET | Refills: 2 | Status: SHIPPED | OUTPATIENT
Start: 2024-03-04

## 2024-03-04 NOTE — TELEPHONE ENCOUNTER
Cholesterol Medication Protocol Puzfvq7603/03/2024 03:45 PM   Protocol Details ALT < 80    ALT resulted within past year    Lipid panel within past 12 months    In person appointment or virtual visit in the past 12 mos or appointment in next 3 mos     Last refill - 12/6/23 - #90   Last ALT - 12/21/23 - 8  Last lipid panel - 12/21/23  Last office visit - 7/10/23  Refilled per protocol

## 2024-03-07 ENCOUNTER — TELEPHONE (OUTPATIENT)
Dept: FAMILY MEDICINE CLINIC | Facility: CLINIC | Age: 89
End: 2024-03-07

## 2024-03-08 NOTE — TELEPHONE ENCOUNTER
Spoke with patient and family member  Given phone # for [facility] for [facility] trim  Verbalized understanding

## 2024-03-11 DIAGNOSIS — R41.3 MEMORY LOSS: ICD-10-CM

## 2024-03-11 RX ORDER — DONEPEZIL HYDROCHLORIDE 5 MG/1
5 TABLET, FILM COATED ORAL NIGHTLY
Qty: 90 TABLET | Refills: 0 | Status: SHIPPED | OUTPATIENT
Start: 2024-03-11

## 2024-03-11 NOTE — TELEPHONE ENCOUNTER
Medication: DONEPEZIL 5 MG Oral Tab      Date of last refill: 12/13/2023 (#90/0)  [place]     Last office visit: 06/28/2023  Due back to clinic per last office note:  RTC 6 months  Date next office visit scheduled:    Future Appointments   Date Time Provider Department Center   6/26/2024  1:00 PM Terry Gudino DO [facility] [place]           Last OV note recommendation:    Assessment:  This is an 89 y/o female with Parkinsons disease. She is doing well on Sinemet 25/100mg TID.  She should take this at 8am noon and 4pm. I will continue donepezil 5mg PO Qday for her memory loss. She has no hallucinations or mood or behavioral changes Seems mild. Maybe Alzheimers or dementia asociated with PD but does not sound like LBD at this time        Plan:  1. Tremor  - Parkinson's disease    - Sinemet 25/100 TID     2. Memory loss  -  Donepezil 5mg PO Qday     RTC 6 months     Terry Gudino DO  Neurology

## 2024-03-15 DIAGNOSIS — G20.A1 PARKINSON'S DISEASE: ICD-10-CM

## 2024-03-18 NOTE — TELEPHONE ENCOUNTER
Medication: CARBIDOPA-LEVODOPA  MG      Date of last refill: 6/28/2023 (#270/2)  [place]      Last office visit: 6/28/2023  Due back to clinic per last office note:  6 months  Date next office visit scheduled:    Future Appointments   Date Time Provider Department Center   6/26/2024  1:00 PM Terry Gudino DO [facility] [place]           Last OV note recommendation:    1. Tremor  - Parkinson's disease    - Sinemet 25/100 TID     2. Memory loss  -  Donepezil 5mg PO Qday     RTC 6 months

## 2024-04-12 ENCOUNTER — OFFICE VISIT (OUTPATIENT)
Dept: FAMILY MEDICINE CLINIC | Facility: CLINIC | Age: 89
End: 2024-04-12
Payer: MEDICARE

## 2024-04-12 VITALS
WEIGHT: 157 LBS | DIASTOLIC BLOOD PRESSURE: 60 MMHG | OXYGEN SATURATION: 97 % | TEMPERATURE: 98 F | HEART RATE: 70 BPM | BODY MASS INDEX: 27.82 KG/M2 | HEIGHT: 63 IN | SYSTOLIC BLOOD PRESSURE: 120 MMHG

## 2024-04-12 DIAGNOSIS — N64.4 MASTALGIA: Primary | ICD-10-CM

## 2024-04-12 NOTE — PROGRESS NOTES
Sammi Cook is a 89 year old female.  HPI:     Patient in office for left breast pain that started 3 days ago.  She reports pain is under left arm to the lateral side of her breast/middle.  She reports pain is intermittent and feels like a shooting pain.  She has taken tylenol with some relief of symptoms.  She has not tried heat.  Patient is concerned about breast cancer since her mother had a history of breast cancer that was caught with in her 90's and she passed away from this.  She also has a friend that had a swollen gland behind her breast that was cancerous.  Patient has not had a mammogram in years.  Asked if patient would like to do a mammogram and she declined.  She does not have pain to her chest or shortness of breath.  Does not have issues with range of motion.  Patient is 89 years old and lives alone.  She is in office with her daughter who does not live close by but patient reports her son lives close by and is available if needed.       Current Outpatient Medications   Medication Sig Dispense Refill    carbidopa-levodopa  MG Oral Tab Take 1 tablet by mouth 3 (three) times daily. 270 tablet 0    donepezil 5 MG Oral Tab Take 1 tablet (5 mg total) by mouth nightly. 90 tablet 0    simvastatin 20 MG Oral Tab Take 1 tablet by mouth nightly 90 tablet 2    levothyroxine 75 MCG Oral Tab TAKE 1 TABLET BY MOUTH ONCE DAILY BEFORE BREAKFAST 90 tablet 0    Meloxicam 15 MG Oral Tab Take 1 tablet (15 mg total) by mouth daily. 30 tablet 0    Multiple Vitamins-Minerals (PRESERVISION AREDS 2 OR) PreserVision AREDS 2      Cholecalciferol (VITAMIN D) 50 MCG (2000 UT) Oral Cap Take by mouth.      aspirin 81 MG Oral Tab Take 1 tablet (81 mg total) by mouth daily.      Cholecalciferol (VITAMIN D) 1000 UNITS Oral Tab Take 1 capsule by mouth daily.          Past Medical History:    Adenomatous colon polyp    Allergic rhinitis    Anal squamous cell carcinoma (HCC)    3/2020 Dr Pratt Patient does not want to treat     Eczema    Esophageal reflux    Exudative senile macular degeneration of retina ([facility])    Hearing impairment    no hearing aids    HYPERLIPIDEMIA    Hypothyroidism    Lumbosacral spondylosis without myelopathy    Osteoarthritis Lumbosacral spine    Nondisplaced fracture of fifth left metatarsal bone    Osteoarthritis of lumbar spine    Osteoporosis    By Dexa    Parkinson's disease ([facility])    Uterovaginal prolapse, incomplete    Cystocele w/incomplete uterine prolaspse    Visual impairment    glasses    Vitamin D deficiency      Social History:  Social History     Socioeconomic History    Marital status:    Tobacco Use    Smoking status: Former     Current packs/day: 0.00     Average packs/day: 0.3 packs/day for 15.0 years (4.5 ttl pk-yrs)     Types: Cigarettes     Start date: 1950     Quit date: 1965     Years since quittin.3    Smokeless tobacco: Former   Vaping Use    Vaping status: Never Used   Substance and Sexual Activity    Alcohol use: No    Drug use: No   Other Topics Concern    Caffeine Concern No     Comment: decaf    Exercise No          REVIEW OF SYSTEMS:   GENERAL HEALTH: feels well otherwise  SKIN: denies any unusual skin lesions or rashes  BREAST:See HPI  RESPIRATORY: denies shortness of breath with exertion  CARDIOVASCULAR: denies chest pain on exertion  GI: denies abdominal pain and denies heartburn      EXAM:   /60   Pulse 70   Temp 98 °F (36.7 °C) (Temporal)   Ht 5' 3\" (1.6 m)   Wt 157 lb (71.2 kg)   SpO2 97%   BMI 27.81 kg/m²     Physical Exam  Constitutional:       Appearance: Normal appearance. She is normal weight.   HENT:      Head: Normocephalic and atraumatic.   Cardiovascular:      Rate and Rhythm: Normal rate and regular rhythm.      Pulses: Normal pulses.      Heart sounds: Normal heart sounds.   Pulmonary:      Effort: Pulmonary effort is normal.      Breath sounds: Normal breath sounds.   Chest:          Comments: Slight pain and small gland felt to area  marked  Musculoskeletal:         General: Normal range of motion.      Cervical back: Normal range of motion.   Skin:     General: Skin is warm and dry.      Capillary Refill: Capillary refill takes less than 2 seconds.   Neurological:      General: No focal deficit present.      Mental Status: She is alert.   Psychiatric:         Mood and Affect: Mood normal.         Behavior: Behavior normal.          ASSESSMENT AND PLAN:     1. Mastalgia  US ordered and scheduling instructions provided.  Recommended continued use of tylenol and can use heat in 20 min increments.   - US BREAST LEFT COMPLETE (CPT=76641); Future      Medical Decision Making  Differentials include but are not limited to muscle spasm, lymphadenopathy, and/or cyst.  Printed information on mastalgia provided.  US instructions provided and recommended use of tylenol and heat in 20 min increments.    The patient indicates understanding of these issues and agrees to the plan.      Katerina Lino, APRN  4/12/2024

## 2024-04-14 DIAGNOSIS — E03.9 ACQUIRED HYPOTHYROIDISM: ICD-10-CM

## 2024-04-15 ENCOUNTER — TELEPHONE (OUTPATIENT)
Dept: FAMILY MEDICINE CLINIC | Facility: CLINIC | Age: 89
End: 2024-04-15

## 2024-04-15 ENCOUNTER — HOSPITAL ENCOUNTER (OUTPATIENT)
Dept: GENERAL RADIOLOGY | Age: 89
Discharge: HOME | End: 2024-04-15
Payer: MEDICARE

## 2024-04-15 DIAGNOSIS — R07.81 RIB PAIN ON LEFT SIDE: ICD-10-CM

## 2024-04-15 DIAGNOSIS — R07.81 RIB PAIN ON LEFT SIDE: Primary | ICD-10-CM

## 2024-04-15 PROCEDURE — 71046 X-RAY EXAM CHEST 2 VIEWS: CPT

## 2024-04-15 RX ORDER — LEVOTHYROXINE SODIUM 0.07 MG/1
TABLET ORAL
Qty: 90 TABLET | Refills: 0 | Status: SHIPPED | OUTPATIENT
Start: 2024-04-15

## 2024-04-15 NOTE — TELEPHONE ENCOUNTER
The ultrasound will not show ribs.  We would need to do a chest xray if this is something you would like evaluated.

## 2024-04-15 NOTE — TELEPHONE ENCOUNTER
Thyroid Medication Protocol Hvdxtk81/15/2024 11:23 AM   Protocol Details TSH in past 12 months    Last TSH value is normal    In person appointment or virtual visit in the past 12 mos or appointment in next 3 mos

## 2024-04-15 NOTE — TELEPHONE ENCOUNTER
Last refill: 01/25/24  qtY: 90  W/ 0 refills  Last ov: 07/10/23    Requested Prescriptions     Pending Prescriptions Disp Refills    LEVOTHYROXINE 75 MCG Oral Tab [Pharmacy Med Name: Levothyroxine Sodium 75 MCG Oral Tablet] 90 tablet 0     Sig: TAKE 1 TABLET BY MOUTH ONCE DAILY BEFORE BREAKFAST     Future Appointments   Date Time Provider Department Center   6/26/2024  1:00 PM Terry Gudino DO [facility] [place]

## 2024-04-15 NOTE — TELEPHONE ENCOUNTER
Deo (son) was not at appointment on 4-12-24 and just wanting to clarify that ultrasound of Left Breast will show the rib area which is where they feel the pain is. They do have US scheduled.        Please advise

## 2024-04-15 NOTE — TELEPHONE ENCOUNTER
SHE SAW [facility] FRIDAY AND RECEIVED AN ORDER FOR A BREAST EXAM BUT THEY THINK THE PAIN IS NOT IN THAT AREA

## 2024-06-17 DIAGNOSIS — R41.3 MEMORY LOSS: ICD-10-CM

## 2024-06-18 RX ORDER — DONEPEZIL HYDROCHLORIDE 5 MG/1
5 TABLET, FILM COATED ORAL NIGHTLY
Qty: 90 TABLET | Refills: 0 | Status: SHIPPED | OUTPATIENT
Start: 2024-06-18

## 2024-06-18 NOTE — TELEPHONE ENCOUNTER
Medication:  DONEPEZIL 5 MG Oral Tab      Date of last refill: 03/11/2024 (#90/0)  [place]     Last office visit: 06/28/2023  Due back to clinic per last office note:  6 months   Date next office visit scheduled:    Future Appointments   Date Time Provider Department Center   6/26/2024  1:00 PM Terry Gudino DO [facility] [facility] [place]   7/10/2024 11:20 AM Moises Brown MD [facility] [place]   7/10/2024 12:00 PM REF [facility] REF [facility] Ref [facility]           Last OV note recommendation:    Assessment:  This is an 89 y/o female with Parkinsons disease. She is doing well on Sinemet 25/100mg TID.  She should take this at 8am noon and 4pm. I will continue donepezil 5mg PO Qday for her memory loss. She has no hallucinations or mood or behavioral changes Seems mild. Maybe Alzheimers or dementia asociated with PD but does not sound like LBD at this time        Plan:  1. Tremor  - Parkinson's disease    - Sinemet 25/100 TID     2. Memory loss  -  Donepezil 5mg PO Qday     RTC 6 months     Terry Gudino DO  Neurology

## 2024-06-26 ENCOUNTER — OFFICE VISIT (OUTPATIENT)
Dept: NEUROLOGY | Facility: CLINIC | Age: 89
End: 2024-06-26

## 2024-06-26 VITALS
RESPIRATION RATE: 17 BRPM | DIASTOLIC BLOOD PRESSURE: 72 MMHG | HEART RATE: 74 BPM | WEIGHT: 153 LBS | BODY MASS INDEX: 27 KG/M2 | SYSTOLIC BLOOD PRESSURE: 128 MMHG

## 2024-06-26 DIAGNOSIS — G20.A1 PARKINSON'S DISEASE (HCC): ICD-10-CM

## 2024-06-26 DIAGNOSIS — R41.3 MEMORY LOSS: ICD-10-CM

## 2024-06-26 PROCEDURE — 99213 OFFICE O/P EST LOW 20 MIN: CPT | Performed by: OTHER

## 2024-06-26 RX ORDER — DONEPEZIL HYDROCHLORIDE 10 MG/1
10 TABLET, FILM COATED ORAL NIGHTLY
Qty: 90 TABLET | Refills: 1 | Status: SHIPPED | OUTPATIENT
Start: 2024-06-26

## 2024-06-26 NOTE — PROGRESS NOTES
Pt reports tremors at night on right hand.  Tremors much worse at night.    Pt does note she has difficulty sleeping.

## 2024-06-26 NOTE — PROGRESS NOTES
Neurology H&P    Sammi Cook Patient Status:  No patient class for patient encounter    1930 MRN [id number]   Location [facility] Attending No att. providers found   Hosp Day # 0 PCP Moises Brown MD     Subjective:  Initial Clinic HPI 18  Sammi Cook is a(n) 89 year old female with a PMH significant for HTN, GERD and OA. She states that she has had about 1 year of mildly progressive R hand shaking and occasional R upper lip. She states that this tremor is present on rest. She denies any L hand tremor. She states that her gait is not changed but had tripped a few times. She denies any feelings of gait imbalance. She denies any dizziness or vertigo. She denies any difficulty swallowing or breathing, She denies any voice changes. Denies any difficulty with stairs but states that she does not use them due to pain in her knees. She denies any family history of tremor or Parkinson's disease. She denies any significant neck pain. She denies any muscle cramping or twitching. She denies any trouble initiating movents or feelings of muscle stiffness or tightness.      Interim History:  Pt was last seen via video on 2023. At that time we continued Sinemet 25/100mg TID and donepezil. She comes back with her son today. They states that her memory seems stable. She does state that she is constipated often. She does not always remember to take fibre or miralax. She does not sleep well.     Current Medications:  Current Outpatient Medications   Medication Sig Dispense Refill    DONEPEZIL 5 MG Oral Tab Take 1 tablet by mouth nightly 90 tablet 0    LEVOTHYROXINE 75 MCG Oral Tab TAKE 1 TABLET BY MOUTH ONCE DAILY BEFORE BREAKFAST 90 tablet 0    carbidopa-levodopa  MG Oral Tab Take 1 tablet by mouth 3 (three) times daily. 270 tablet 0    simvastatin 20 MG Oral Tab Take 1 tablet by mouth nightly 90 tablet 2    Multiple Vitamins-Minerals (PRESERVISION AREDS 2 OR) PreserVision AREDS 2       Cholecalciferol (VITAMIN D) 50 MCG (2000) Oral Cap Take by mouth.      aspirin 81 MG Oral Tab Take 1 tablet (81 mg total) by mouth daily.      Cholecalciferol (VITAMIN D) 1000 UNITS Oral Tab Take 1 capsule by mouth daily.           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)    Memory loss    Alzheimer's disease, unspecified (HCC)       PMHx:  Past Medical History:    Adenomatous colon polyp    Allergic rhinitis    Anal squamous cell carcinoma ([facility])    3/2020 Dr Pratt Patient does not want to treat    Eczema    Esophageal reflux    Exudative senile macular degeneration of retina ([facility])    Hearing impairment    no hearing aids    HYPERLIPIDEMIA    Hypothyroidism    Lumbosacral spondylosis without myelopathy    Osteoarthritis Lumbosacral spine    Nondisplaced fracture of fifth left metatarsal bone    Osteoarthritis of lumbar spine    Osteoporosis    By Dexa    Parkinson's disease (HCC)    Uterovaginal prolapse, incomplete    Cystocele w/incomplete uterine prolaspse    Visual impairment    glasses    Vitamin D deficiency       PSHx:  Past Surgical History:   Procedure Laterality Date    Cholecystectomy      gallbladder    Colonoscopy      3/2008 diverticulosis mayo    Tonsillectomy         SocHx:  Social History     Socioeconomic History    Marital status:    Tobacco Use    Smoking status: Former     Current packs/day: 0.00     Average packs/day: 0.3 packs/day for 15.0 years (4.5 ttl pk-yrs)     Types: Cigarettes     Start date: 1950     Quit date: 1965     Years since quittin.5    Smokeless tobacco: Former   Vaping Use    Vaping status: Never Used   Substance and Sexual Activity    Alcohol use: No    Drug use: No   Other Topics Concern    Caffeine Concern No     Comment: decaf    Exercise No       Family  History:  Family History   Problem Relation Age of Onset    Eye Problems Mother         AMD    Cancer Mother         Breast    Heart Attack Father 72        MI    Other (CAD) Father     Cancer Brother         Brain tumor       ROS:  10 point ROS completed and pertinent positives in HPI    Objective/Physical Exam:    Vital Signs:  There were no vitals taken for this visit.    Gen: Awake and in no apparent distress  HEENT: moist mucus membranes  Neck: Supple  Cardiovascular: Regular rate and rhythm, no murmur  Pulm: CTAB  GI: non-tender, normal bowel sounds  Skin: normal, dry  Extremities: No clubbing or cyanosis      Neurologic:   MENTAL STATUS: alert, ox3, normal attention, language and fund of knowledge.      CRANIAL NERVES II to XII: PERRLA, no ptosis or diplopia, EOM intact in horizontal direction but restricted in up gaze, facial sensation intact, strong eye closure, mildly masked facies, no dysarthria, no tongue fasciculations or atrophy, strong shoulder shrug.    MOTOR EXAMINATION: mild LUE cogwheeling today, no fasciculations, normal strength throughout in UEs though mild 4+/5  strength, 4+/5 b/l HF strength,     SENSORY EXAMINATION:  UE: intact to light touch   LE: intact to light touch     COORDINATION:  No dysmetria, mild UE intention tremor, R hand resting tremor    REFLEXES: 2+ at biceps, 2+ triceps, 2+ at patella    GAIT: normal stance, mildly shuffling gait, RUE tremor when walking, mildly reduced RUE arm swing, no en bloc turning, mild shuffling gait, mildly stooped posture       Labs:       Imaging:  MRI Brain 1/2018  =====  CONCLUSION:    1. No acute infarct or intracranial hemorrhage.  2. Mild chronic small vessel ischemic disease.  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    Assessment:  This is an 89 y/o female with Parkinsons disease. She is doing well on Sinemet 25/100mg TID.  She should take this at 8am noon and 4pm. I will continue donepezil 10mg PO Qday for her memory loss.  She has no hallucinations or mood or behavioral changes Seems mild. Maybe Alzheimers or dementia asociated with PD but does not sound like LBD at this time      Plan:  1. Tremor  - Parkinson's disease    - Sinemet 25/100 TID    2. Memory loss  -  Donepezil 10mg PO Qday    RTC 6 months    Terry Gudino,   Neurology

## 2024-06-26 NOTE — PATIENT INSTRUCTIONS
After your visit at the [place] office today,  please direct any follow up questions or medication needs to the staff in our [place] office so that your concerns may be promptly addressed.  We are available through [employer] or at the numbers below:    The phone number is:   [phone] option #1    The fax number is:  [phone]    Your pharmacy should also send any requests electronically to the [place] office.    Refill policies:    Allow 2-3 business days for refills; controlled substances may take longer.  Contact your pharmacy at least 5 days prior to running out of medication and have them send an electronic request or submit request through the “request refill” option in your [employer] account.  Refills are not addressed on weekends; covering physicians do not authorize routine medications on weekends.  No narcotics or controlled substances are refilled after noon on Fridays or by on call physicians.  By law, narcotics must be electronically prescribed.  A 30 day supply with no refills is the maximum allowed.  If your prescription is due for a refill, you may be due for a follow up appointment.  To best provide you care, patients receiving routine medications need to be seen at least once a year.  Patients receiving narcotic/controlled substance medications need to be seen at least once every 3 months.  In the event that your preferred pharmacy does not have the requested medication in stock (e.g. Backordered), it is your responsibility to find another pharmacy that has the requested medication available.  We will gladly send a new prescription to that pharmacy at your request.    Scheduling Tests:    If your physician has ordered radiology tests such as MRI or CT scans, please contact Central Scheduling at [phone] right away to schedule the test.  Once scheduled, the [facility] Centralized Referral Team will work with your insurance carrier to obtain pre-certification or prior authorization.   Depending on your insurance carrier, approval may take 3-10 days.  It is highly recommended patients assure they have received an authorization before having a test performed.  If test is done without insurance authorization, patient may be responsible for the entire amount billed.      Precertification and Prior Authorizations:  If your physician has recommended that you have a procedure or additional testing performed the [facility] Centralized Referral Team will contact your insurance carrier to obtain pre-certification or prior authorization.    You are strongly encouraged to contact your insurance carrier to verify that your procedure/test has been approved and is a COVERED benefit.  Although the [facility] Centralized Referral Team does its due diligence, the insurance carrier gives the disclaimer that \"Although the procedure is authorized, this does not guarantee payment.\"    Ultimately the patient is responsible for payment.   Thank you for your understanding in this matter.  Paperwork Completion:  If you require FMLA or disability paperwork for your recovery, please make sure to either drop it off or have it faxed to our office at [phone]. Be sure the form has your name and date of birth on it.  The form will be faxed to our Forms Department and they will complete it for you.  There is a 25$ fee for all forms that need to be filled out.  Please be aware there is a 10-14 day turnaround time.  You will need to sign a release of information (RACHAEL) form if your paperwork does not come with one.  You may call the Forms Department with any questions at [phone].  Their fax number is [phone].

## 2024-07-10 ENCOUNTER — OFFICE VISIT (OUTPATIENT)
Dept: FAMILY MEDICINE CLINIC | Facility: CLINIC | Age: 89
End: 2024-07-10
Payer: MEDICARE

## 2024-07-10 ENCOUNTER — LABORATORY ENCOUNTER (OUTPATIENT)
Dept: LAB | Age: 89
End: 2024-07-10
Attending: FAMILY MEDICINE
Payer: MEDICARE

## 2024-07-10 VITALS
SYSTOLIC BLOOD PRESSURE: 130 MMHG | HEIGHT: 63 IN | DIASTOLIC BLOOD PRESSURE: 80 MMHG | HEART RATE: 78 BPM | RESPIRATION RATE: 16 BRPM | TEMPERATURE: 98 F | OXYGEN SATURATION: 97 % | BODY MASS INDEX: 27.2 KG/M2 | WEIGHT: 153.5 LBS

## 2024-07-10 DIAGNOSIS — E03.9 ACQUIRED HYPOTHYROIDISM: ICD-10-CM

## 2024-07-10 DIAGNOSIS — Z00.00 ENCOUNTER FOR MEDICARE ANNUAL WELLNESS EXAM: Primary | ICD-10-CM

## 2024-07-10 DIAGNOSIS — E78.5 HYPERLIPIDEMIA, UNSPECIFIED HYPERLIPIDEMIA TYPE: ICD-10-CM

## 2024-07-10 DIAGNOSIS — Z79.899 ENCOUNTER FOR LONG-TERM (CURRENT) USE OF MEDICATIONS: ICD-10-CM

## 2024-07-10 DIAGNOSIS — H35.3230 BILATERAL EXUDATIVE AGE-RELATED MACULAR DEGENERATION, UNSPECIFIED STAGE (HCC): ICD-10-CM

## 2024-07-10 DIAGNOSIS — G30.9 ALZHEIMER'S DISEASE, UNSPECIFIED (HCC): ICD-10-CM

## 2024-07-10 DIAGNOSIS — F02.80 ALZHEIMER'S DISEASE, UNSPECIFIED (HCC): ICD-10-CM

## 2024-07-10 DIAGNOSIS — G20.A2 PARKINSON'S DISEASE WITHOUT DYSKINESIA, WITH FLUCTUATING MANIFESTATIONS (HCC): ICD-10-CM

## 2024-07-10 LAB
ANION GAP SERPL CALC-SCNC: 3 MMOL/L (ref 0–18)
BUN BLD-MCNC: 18 MG/DL (ref 9–23)
CALCIUM BLD-MCNC: 8.9 MG/DL (ref 8.5–10.1)
CHLORIDE SERPL-SCNC: 109 MMOL/L (ref 98–112)
CHOLEST SERPL-MCNC: 170 MG/DL (ref ?–200)
CO2 SERPL-SCNC: 30 MMOL/L (ref 21–32)
CREAT BLD-MCNC: 0.85 MG/DL
EGFRCR SERPLBLD CKD-EPI 2021: 64 ML/MIN/1.73M2 (ref 60–?)
GLUCOSE BLD-MCNC: 106 MG/DL (ref 70–99)
HDLC SERPL-MCNC: 77 MG/DL (ref 40–59)
LDLC SERPL CALC-MCNC: 73 MG/DL (ref ?–100)
NONHDLC SERPL-MCNC: 93 MG/DL (ref ?–130)
OSMOLALITY SERPL CALC.SUM OF ELEC: 296 MOSM/KG (ref 275–295)
POTASSIUM SERPL-SCNC: 4.3 MMOL/L (ref 3.5–5.1)
SODIUM SERPL-SCNC: 142 MMOL/L (ref 136–145)
T4 FREE SERPL-MCNC: 1.1 NG/DL (ref 0.8–1.7)
TRIGL SERPL-MCNC: 116 MG/DL (ref 30–149)
TSI SER-ACNC: 0.86 MIU/ML (ref 0.36–3.74)
VLDLC SERPL CALC-MCNC: 18 MG/DL (ref 0–30)

## 2024-07-10 PROCEDURE — 84439 ASSAY OF FREE THYROXINE: CPT

## 2024-07-10 PROCEDURE — G0439 PPPS, SUBSEQ VISIT: HCPCS | Performed by: FAMILY MEDICINE

## 2024-07-10 PROCEDURE — 36415 COLL VENOUS BLD VENIPUNCTURE: CPT

## 2024-07-10 PROCEDURE — 99213 OFFICE O/P EST LOW 20 MIN: CPT | Performed by: FAMILY MEDICINE

## 2024-07-10 PROCEDURE — 80061 LIPID PANEL: CPT

## 2024-07-10 PROCEDURE — 84443 ASSAY THYROID STIM HORMONE: CPT

## 2024-07-10 PROCEDURE — 80048 BASIC METABOLIC PNL TOTAL CA: CPT

## 2024-07-10 NOTE — ASSESSMENT & PLAN NOTE
Thyroid shows Good control.   TSH: 0.425, done on 12/21/2023.  FT4: 1.2, done on 12/21/2023.   Thyroid therapy includes LEVOTHYROXINE 75 MCG Oral Tab [[id number]], LEVOTHYROXINE 75 MCG Oral Tab [[id number]] (Long-Term Med).

## 2024-07-10 NOTE — ASSESSMENT & PLAN NOTE
Stable    [x] chronic stable condition, noted historically, continues present status    Cannot participate in visual screen due to this

## 2024-07-10 NOTE — ASSESSMENT & PLAN NOTE
Cholesterol shows Good control. Long term heart-healthy diet and lifestyle discussed and encouraged to reduce risk of cardiovascular disease.  Cholesterol: 175, done on 12/21/2023.  HDL Cholesterol: 90, done on 12/21/2023.  TriGlycerides 80, done on 12/21/2023.  LDL Cholesterol: 70, done on 12/21/2023.   Cholesterol medications include simvastatin 20 MG Oral Tab [[id number]], simvastatin 20 MG Oral Tab [[id number]] (Long-Term Med).

## 2024-07-10 NOTE — PROGRESS NOTES
Subjective:   Sammi Cook is a 89 year old female who presents for a Medicare Subsequent Annual Wellness visit (Pt already had Initial Annual Wellness) and scheduled follow up of multiple significant but stable problems.     Parkinsons  On medications    Stable  No change carbidopa    Alzheimers     Stable on donepezil    Also  Patient has macular degeneration  And this is not improving      History/Other:   Fall Risk Assessment:   She has been screened for Falls and is High Risk. Fall Prevention information provided to patient in After Visit Summary.    Do you feel unsteady when standing or walking?: Yes  Do you worry about falling?: Yes  Have you fallen in the past year?: No     Cognitive Assessment:   Abnormal  What day of the week is this?: Correct  What month is it?: Correct  What year is it?: Incorrect  Recall \"Ball\": Correct  Recall \"Flag\": Correct  Recall \"Tree\": Incorrect    Functional Ability/Status:   Sammi Cook has some abnormal functions as listed below:  She has Driving difficulties based on screening of functional status. She has difficulties Managing Money/Bills based on screening of functional status.She has difficulties Shopping for Groceries based on screening of functional status. She has Hearing problems based on screening of functional status.She has Vision problems based on screening of functional status. She has problems with Memory based on screening of functional status.       Depression Screening (PHQ):  PHQ-2 SCORE: 1  , done 7/10/2024   Feeling down, depressed, or hopeless: 1              Advanced Directives:   She does have a Living Will but we do NOT have it on file in Epic.    She has a Power of  for Health Care on file in [facility].  Discussed Advance Care Planning with patient (and family/surrogate if present). Standard forms made available to patient in After Visit Summary.      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    Current Medications:  Outpatient Medications Marked as Taking for the 7/10/24 encounter (Office Visit) with Moises Brown MD   Medication Sig    donepezil 10 MG Oral Tab Take 1 tablet (10 mg total) by mouth nightly.    carbidopa-levodopa  MG Oral Tab Take 1 tablet by mouth 3 (three) times daily.    LEVOTHYROXINE 75 MCG Oral Tab TAKE 1 TABLET BY MOUTH ONCE DAILY BEFORE BREAKFAST    simvastatin 20 MG Oral Tab Take 1 tablet by mouth nightly    Multiple Vitamins-Minerals (PRESERVISION AREDS 2 OR) PreserVision AREDS 2    Cholecalciferol (VITAMIN D) 50 MCG (2000 UT) Oral Cap Take by mouth.    aspirin 81 MG Oral Tab Take 1 tablet (81 mg total) by mouth daily.       Medical History:  She  has a past medical history of Adenomatous colon polyp, Allergic rhinitis, Anal squamous cell carcinoma ([facility]), Eczema, Esophageal reflux (4/19/2007), Exudative senile macular degeneration of retina ([facility]) (09/14/2011), Hearing impairment, HYPERLIPIDEMIA, Hypothyroidism, Lumbosacral spondylosis without myelopathy (9/29/2008), Nondisplaced fracture of fifth left metatarsal bone (9/19/2014), Osteoarthritis of lumbar spine, Osteoporosis (4/19/2007), Parkinson's disease ([facility]), Uterovaginal prolapse, incomplete (1/12/2012), Visual impairment, and Vitamin D deficiency (7/26/2012).  Surgical History:  She  has a past surgical history that includes tonsillectomy; cholecystectomy; and colonoscopy.   Family History:  Her family history includes CAD in her father; Cancer in her brother and mother; Eye Problems in her mother; Heart Attack (age of onset: 72) in her father.  Social History:  She  reports that she quit smoking about 59 years ago. Her smoking use included cigarettes. She started smoking about 74 years ago. She has a 4.5 pack-year smoking history. She has quit using  smokeless tobacco. She reports that she does not drink alcohol and does not use drugs.    Tobacco:  She smoked tobacco in the past but quit greater than 12 months ago.  Social History     Tobacco Use   Smoking Status Former    Current packs/day: 0.00    Average packs/day: 0.3 packs/day for 15.0 years (4.5 ttl pk-yrs)    Types: Cigarettes    Start date: 1950    Quit date: 1965    Years since quittin.5   Smokeless Tobacco Former          CAGE Alcohol Screen:   CAGE screening score of 0 on 7/10/2024, showing low risk of alcohol abuse.      Patient Care Team:  Moises Brown MD as PCP - General (Family Medicine)  Terry Gudino DO as Consulting Physician (NEUROLOGY)    Review of Systems  GENERAL: feels well otherwise  SKIN: denies any unusual skin lesions  EYES: see HPI   HEENT: denies nasal congestion, sinus pain or ST  LUNGS: denies shortness of breath with exertion  CARDIOVASCULAR: denies chest pain on exertion  GI: denies abdominal pain, denies heartburn  : denies dysuria, vaginal discharge or itching, no complaint of urinary incontinence   MUSCULOSKELETAL: denies back pain  NEURO: denies headaches  has dementia stable  Parkinsons is stable  [place]]lks no aid and no obvious tremor    PSYCHE: denies depression or anxiety  HEMATOLOGIC: denies hx of anemia  ENDOCRINE: denies thyroid history  ALL/ASTHMA: denies hx of allergy or asthma    Objective:   Physical Exam  General Appearance:  Alert, cooperative, no distress, appears stated age   Head:  Normocephalic, without obvious abnormality, atraumatic   Eyes:  PERRL, conjunctiva/corneas clear, EOM's intact both eyes   Back:   Symmetric, no curvature, ROM normal, no CVA tenderness   Lungs:   Clear to auscultation bilaterally, respirations unlabored   Heart:  Regular rate and rhythm, S1 and S2 normal, no murmur, rub, or gallop   Abdomen:   Soft, non-tender, bowel sounds active all four quadrants,  no masses, no organomegaly   Pelvic: Deferred   Extremities:  Extremities normal, atraumatic, no cyanosis or edema   Pulses: 2+ and symmetric   Skin: Skin color, texture, turgor normal, no rashes or lesions   Lymph nodes: Cervical, supraclavicular, and axillary nodes normal   Neurologic: Normal  no tremor   walks no aid        /80 (BP Location: Left arm, Patient Position: Sitting, Cuff Size: adult)   Pulse 78   Temp 97.8 °F (36.6 °C) (Temporal)   Resp 16   Ht 5' 3\" (1.6 m)   Wt 153 lb 8 oz (69.6 kg)   SpO2 97%   BMI 27.19 kg/m²  Estimated body mass index is 27.19 kg/m² as calculated from the following:    Height as of this encounter: 5' 3\" (1.6 m).    Weight as of this encounter: 153 lb 8 oz (69.6 kg).    Medicare Hearing Assessment:   Hearing Screening    Time taken: 7/10/2024 11:24 AM  Entry User: Flaquita Schwarz RN  Screening Method: Finger Rub  Finger Rub Result: Fail         Visual Acuity:   Right Eye Visual Acuity: Corrected Right Eye Chart Acuity: 20/200   Left Eye Visual Acuity: Corrected Left Eye Chart Acuity: 20/200   Both Eyes Visual Acuity: Corrected Both Eyes Chart Acuity: 20/200   Able To Tolerate Visual Acuity: Yes        Assessment & Plan:   Sammi Cook is a 89 year old female who presents for a Medicare Assessment.     1. Encounter for Medicare annual wellness exam (Primary)  2. Acquired hypothyroidism  Overview:  Endocrine Medications            LEVOTHYROXINE 75 MCG Oral Tab            Assessment & Plan:  Thyroid shows Good control.   TSH: 0.425, done on 12/21/2023.  FT4: 1.2, done on 12/21/2023.   Thyroid therapy includes LEVOTHYROXINE 75 MCG Oral Tab [[id number]], LEVOTHYROXINE 75 MCG Oral Tab [[id number]] (Long-Term Med).      Orders:  -     TSH and Free T4; Future; Expected date: 07/10/2024  3. Hyperlipidemia, unspecified hyperlipidemia type  Overview:  Cholesterol Lowering Medications            SIMVASTATIN 20 MG Oral Tab            Assessment & Plan:  Cholesterol shows Good control. Long term heart-healthy diet and lifestyle discussed and  encouraged to reduce risk of cardiovascular disease.  Cholesterol: 175, done on 12/21/2023.  HDL Cholesterol: 90, done on 12/21/2023.  TriGlycerides 80, done on 12/21/2023.  LDL Cholesterol: 70, done on 12/21/2023.   Cholesterol medications include simvastatin 20 MG Oral Tab [[id number]], simvastatin 20 MG Oral Tab [[id number]] (Long-Term Med).      Orders:  -     Basic Metabolic Panel (8); Future; Expected date: 07/10/2024  -     Lipid Panel; Future; Expected date: 07/10/2024  4. Bilateral exudative age-related macular degeneration, unspecified stage (HCC)  Overview:  Very poor vision remains due to  this    Assessment & Plan:   Stable    [x] chronic stable condition, noted historically, continues present status    Cannot participate in visual screen due to this   5. Parkinson's disease without dyskinesia, with fluctuating manifestations (HCC)  Overview:  On carbidopa  Assessment & Plan:   Stable    [x] chronic stable condition, noted historically, continues present status    6. Alzheimer's disease, unspecified (HCC)  Overview:  On donepezil    Assessment & Plan:   Stable    [x] chronic stable condition, noted historically, continues present status    7. Encounter for long-term (current) use of medications  The patient indicates understanding of these issues and agrees to the plan.  Continue with current treatment plan.  Lab work ordered.  Reinforced healthy diet, lifestyle, and exercise.      No follow-ups on file.     Moises Brown MD, 7/10/2024     Supplementary Documentation:   General Health:  In the past six months, have you lost more than 10 pounds without trying?: 2 - No  Has your appetite been poor?: No  Type of Diet: Balanced  How does the patient maintain a good energy level?: Other  How would you describe your daily physical activity?: Light  How would you describe your current health state?: Poor  How do you maintain positive mental well-being?: Visiting Friends;Visiting Family  On a scale of 0 to 10,  with 0 being no pain and 10 being severe pain, what is your pain level?: 0 - (None)  In the past six months, have you experienced urine leakage?: 1-Yes  At any time do you feel concerned for the safety/well-being of yourself and/or your children, in your home or elsewhere?: No  Have you had any immunizations at another office such as Influenza, Hepatitis B, Tetanus, or Pneumococcal?: No    Health Maintenance   Topic Date Due    Zoster Vaccines (2 of 3) 05/11/2017    COVID-19 Vaccine (6 - 2023-24 season) 09/01/2023    Annual Physical  07/10/2024    Influenza Vaccine (1) 10/01/2024    Annual Depression Screening  Completed    Fall Risk Screening (Annual)  Completed    Pneumococcal Vaccine: 65+ Years  Completed

## 2024-07-16 DIAGNOSIS — E03.9 ACQUIRED HYPOTHYROIDISM: Primary | ICD-10-CM

## 2024-07-22 DIAGNOSIS — E03.9 ACQUIRED HYPOTHYROIDISM: ICD-10-CM

## 2024-07-23 RX ORDER — LEVOTHYROXINE SODIUM 0.07 MG/1
TABLET ORAL
Qty: 90 TABLET | Refills: 1 | Status: SHIPPED | OUTPATIENT
Start: 2024-07-23

## 2024-07-23 NOTE — TELEPHONE ENCOUNTER
LEVOTHYROXINE 75 MCG Oral Tab     Thyroid Medication Protocol [id number]/2024 05:15 PM   Protocol Details TSH in past 12 months    Last TSH value is normal    In person appointment or virtual visit in the past 12 mos or appointment in next 3 mos      Last office visit:  7/10/24    No future appointments.  Last filled:  4/15/24  #90   Last labs: 7/10/24 TSH: 0.863

## 2024-09-09 RX ORDER — SIMVASTATIN 20 MG
TABLET ORAL
Qty: 90 TABLET | Refills: 0 | OUTPATIENT
Start: 2024-09-09

## 2024-09-19 ENCOUNTER — TELEPHONE (OUTPATIENT)
Dept: FAMILY MEDICINE CLINIC | Facility: CLINIC | Age: 89
End: 2024-09-19

## 2024-11-22 ENCOUNTER — TELEPHONE (OUTPATIENT)
Dept: FAMILY MEDICINE CLINIC | Facility: CLINIC | Age: 89
End: 2024-11-22

## 2024-11-22 DIAGNOSIS — G20.A2 PARKINSON'S DISEASE WITHOUT DYSKINESIA, WITH FLUCTUATING MANIFESTATIONS (HCC): Primary | ICD-10-CM

## 2024-11-22 NOTE — TELEPHONE ENCOUNTER
1) Per patient due to Parkinsons' having pain in bilateral lower extremities for awhile but seems to be getting worse. Would like order for a walker. Agrees to use [facility]. Referral placed        2) Patient also wondering what the best treatment for her leg pain, worse in the morning when she gets up. Has taken Tylenol ES one as needed and does seem to help.

## 2024-11-26 NOTE — TELEPHONE ENCOUNTER
Patient notified of provider that order for walker was faxed to [facility] in [place] and agrees to contact office if not contacted.   Also notified of provider recommendation to take ibuprofen 200 mg 1-2 in morning for leg pain, verbalized understanding.

## 2024-12-02 RX ORDER — SIMVASTATIN 20 MG
TABLET ORAL
Qty: 90 TABLET | Refills: 0 | Status: SHIPPED | OUTPATIENT
Start: 2024-12-02

## 2024-12-02 NOTE — TELEPHONE ENCOUNTER
Last refill:  03/04/24  Qty: 90  W/ 2 refills  Alst ov: 07/10/24    Requested Prescriptions     Pending Prescriptions Disp Refills    SIMVASTATIN 20 MG Oral Tab [Pharmacy Med Name: Simvastatin 20 MG Oral Tablet] 90 tablet 0     Sig: Take 1 tablet by mouth nightly     Future Appointments   Date Time Provider Department Center   3/26/2025 11:40 AM Terry Gudino DO [facility] [place]

## 2025-01-12 DIAGNOSIS — E03.9 ACQUIRED HYPOTHYROIDISM: ICD-10-CM

## 2025-01-13 RX ORDER — LEVOTHYROXINE SODIUM 75 UG/1
TABLET ORAL
Qty: 90 TABLET | Refills: 0 | Status: SHIPPED | OUTPATIENT
Start: 2025-01-13

## 2025-01-13 NOTE — TELEPHONE ENCOUNTER
OV 07/10/24  LABS 07/10/24    REFILL 07/23/24 #90 +1 RF    Future Appointments   Date Time Provider Department Center   3/26/2025 11:40 AM Terry Gudino DO [facility] [place]     TSH LABS AND OV DUE 01/10/25. Mychart sent to patient to schedule above.

## 2025-01-15 ENCOUNTER — OFFICE VISIT (OUTPATIENT)
Dept: FAMILY MEDICINE CLINIC | Facility: CLINIC | Age: OVER 89
End: 2025-01-15
Payer: MEDICARE

## 2025-01-15 DIAGNOSIS — H35.3230 BILATERAL EXUDATIVE AGE-RELATED MACULAR DEGENERATION, UNSPECIFIED STAGE (HCC): ICD-10-CM

## 2025-01-15 DIAGNOSIS — G30.9 ALZHEIMER'S DISEASE, UNSPECIFIED (HCC): ICD-10-CM

## 2025-01-15 DIAGNOSIS — G20.A2 PARKINSON'S DISEASE WITHOUT DYSKINESIA, WITH FLUCTUATING MANIFESTATIONS (HCC): ICD-10-CM

## 2025-01-15 DIAGNOSIS — Z79.899 ENCOUNTER FOR LONG-TERM (CURRENT) USE OF MEDICATIONS: Primary | ICD-10-CM

## 2025-01-15 DIAGNOSIS — F02.80 ALZHEIMER'S DISEASE, UNSPECIFIED (HCC): ICD-10-CM

## 2025-01-15 DIAGNOSIS — E78.5 HYPERLIPIDEMIA, UNSPECIFIED HYPERLIPIDEMIA TYPE: ICD-10-CM

## 2025-01-15 DIAGNOSIS — E03.9 ACQUIRED HYPOTHYROIDISM: ICD-10-CM

## 2025-01-15 DIAGNOSIS — C21.0 ANAL SQUAMOUS CELL CARCINOMA (HCC): ICD-10-CM

## 2025-01-15 LAB
ALBUMIN SERPL-MCNC: 4.1 G/DL (ref 3.2–4.8)
ALBUMIN/GLOB SERPL: 1.9 {RATIO} (ref 1–2)
ALP LIVER SERPL-CCNC: 37 U/L
ALT SERPL-CCNC: <7 U/L
ANION GAP SERPL CALC-SCNC: 4 MMOL/L (ref 0–18)
AST SERPL-CCNC: 13 U/L (ref ?–34)
BILIRUB SERPL-MCNC: 0.4 MG/DL (ref 0.2–0.9)
BUN BLD-MCNC: 26 MG/DL (ref 9–23)
CALCIUM BLD-MCNC: 9.1 MG/DL (ref 8.7–10.6)
CHLORIDE SERPL-SCNC: 113 MMOL/L (ref 98–112)
CHOLEST SERPL-MCNC: 178 MG/DL (ref ?–200)
CO2 SERPL-SCNC: 29 MMOL/L (ref 21–32)
CREAT BLD-MCNC: 1.12 MG/DL
EGFRCR SERPLBLD CKD-EPI 2021: 46 ML/MIN/1.73M2 (ref 60–?)
FASTING PATIENT LIPID ANSWER: NO
FASTING STATUS PATIENT QL REPORTED: NO
GLOBULIN PLAS-MCNC: 2.2 G/DL (ref 2–3.5)
GLUCOSE BLD-MCNC: 105 MG/DL (ref 70–99)
HDLC SERPL-MCNC: 68 MG/DL (ref 40–59)
LDLC SERPL CALC-MCNC: 91 MG/DL (ref ?–100)
NONHDLC SERPL-MCNC: 110 MG/DL (ref ?–130)
OSMOLALITY SERPL CALC.SUM OF ELEC: 307 MOSM/KG (ref 275–295)
POTASSIUM SERPL-SCNC: 4.5 MMOL/L (ref 3.5–5.1)
PROT SERPL-MCNC: 6.3 G/DL (ref 5.7–8.2)
SODIUM SERPL-SCNC: 146 MMOL/L (ref 136–145)
T4 FREE SERPL-MCNC: 1.2 NG/DL (ref 0.8–1.7)
TRIGL SERPL-MCNC: 110 MG/DL (ref 30–149)
TSI SER-ACNC: 1.74 UIU/ML (ref 0.55–4.78)
VLDLC SERPL CALC-MCNC: 18 MG/DL (ref 0–30)

## 2025-01-15 PROCEDURE — G2211 COMPLEX E/M VISIT ADD ON: HCPCS | Performed by: FAMILY MEDICINE

## 2025-01-15 PROCEDURE — 80053 COMPREHEN METABOLIC PANEL: CPT | Performed by: FAMILY MEDICINE

## 2025-01-15 PROCEDURE — 80061 LIPID PANEL: CPT | Performed by: FAMILY MEDICINE

## 2025-01-15 PROCEDURE — 84443 ASSAY THYROID STIM HORMONE: CPT | Performed by: FAMILY MEDICINE

## 2025-01-15 PROCEDURE — 99214 OFFICE O/P EST MOD 30 MIN: CPT | Performed by: FAMILY MEDICINE

## 2025-01-15 PROCEDURE — 84439 ASSAY OF FREE THYROXINE: CPT | Performed by: FAMILY MEDICINE

## 2025-01-15 NOTE — PROGRESS NOTES
Subjective:   Sammi Cook is a 89 year old female who presents for Medication Follow-Up       History/Other:   History of Present Illness  The patient, with a history of chronic lower back pain and thyroid issues, presents for a routine check-up. The patient reports no recent falls and has been using a walker for mobility, which has been beneficial. The patient continues to experience chronic lower back pain, which is somewhat managed with ibuprofen and Tylenol. The pain often wakes the patient up at night, and movement seems to alleviate it. The patient also reports issues with sleep, often having thoughts that keep her awake. The patient is on thyroid medication, which has been refilled recently. The patient denies any cough or other respiratory symptoms.    New rollator walker works great and very thankful as functional with brake and has seat and can transport laundry on the seat as well     Chief Complaint Reviewed and Verified  No Further Nursing Notes to   Review  Tobacco Reviewed  Allergies Reviewed  Medications Reviewed    Problem List Reviewed  Medical History Reviewed  Surgical History   Reviewed  Family History Reviewed         Tobacco:  She smoked tobacco in the past but quit greater than 12 months ago.  Social History     Tobacco Use   Smoking Status Former    Current packs/day: 0.00    Average packs/day: 0.3 packs/day for 15.0 years (4.5 ttl pk-yrs)    Types: Cigarettes    Start date: 1950    Quit date: 1965    Years since quittin.0   Smokeless Tobacco Former        Current Outpatient Medications   Medication Sig Dispense Refill    LEVOTHYROXINE 75 MCG Oral Tab TAKE 1 TABLET BY MOUTH ONCE DAILY BEFORE BREAKFAST 90 tablet 0    SIMVASTATIN 20 MG Oral Tab Take 1 tablet by mouth nightly 90 tablet 0    donepezil 10 MG Oral Tab Take 1 tablet (10 mg total) by mouth nightly. 90 tablet 1    carbidopa-levodopa  MG Oral Tab Take 1 tablet by mouth 3 (three) times daily. 270 tablet  2    Multiple Vitamins-Minerals (PRESERVISION AREDS 2 OR) PreserVision AREDS 2      Cholecalciferol (VITAMIN D) 50 MCG (2000 UT) Oral Cap Take by mouth.      aspirin 81 MG Oral Tab Take 1 tablet (81 mg total) by mouth daily.         PHQ-2 SCORE: 0  , done 1/15/2025          Review of Systems:  Pertinent items are noted in HPI.      Objective:   There were no vitals taken for this visit. Estimated body mass index is 27.19 kg/m² as calculated from the following:    Height as of 7/10/24: 5' 3\" (1.6 m).    Weight as of 7/10/24: 153 lb 8 oz (69.6 kg).  Results       Physical Exam  CHEST: Lungs clear to auscultation, no wheezes.  CARDIOVASCULAR: Heart rhythm regular, no murmurs detected.  EXTREMITIES: No ankle edema noted.  Skin warm dry  No pallor jaundice  No icterus  NECK  no thyromegaly    Assessment & Plan:   1. Encounter for long-term (current) use of medications (Primary)  -     Comp Metabolic Panel (14)  2. Hyperlipidemia, unspecified hyperlipidemia type  Overview:  Cholesterol Lowering Medications            SIMVASTATIN 20 MG Oral Tab            Assessment & Plan:  Cholesterol shows Good control. Long term heart-healthy diet and lifestyle discussed and encouraged to reduce risk of cardiovascular disease.  7/10/2024: Cholesterol, Total 170; HDL Cholesterol 77 (H); Triglycerides 116; LDL Cholesterol 73  Cholesterol Meds: simvastatin Tabs - 20 MG  stable  Continue with current treatment plan  Lab work ordered.    Orders:  -     Comp Metabolic Panel (14)  -     Lipid Panel  3. Acquired hypothyroidism  Overview:  Endocrine Medications            LEVOTHYROXINE 75 MCG Oral Tab            Assessment & Plan:  Thyroid shows Good control and Good compliance.   7/10/2024: TSH 0.863  Thryoid Meds: levothyroxine Tabs - 75 MCGhypothyroidism stable current treatment plan effective, no change in therapy  Labs ordered    Orders:  -     TSH and Free T4  4. Parkinson's disease without dyskinesia, with fluctuating manifestations  ([facility])  Overview:  On carbidopa  5. Bilateral exudative age-related macular degeneration, unspecified stage ([facility])  Overview:  Very poor vision remains due to  this    6. Alzheimer's disease, unspecified ([facility])  Overview:  On donepezil      Assessment & Plan  Chronic Lower Back Pain  Persistent pain, managed with ibuprofen and Tylenol. Pain increases at night, causing sleep disturbances.  -Continue ibuprofen and Tylenol as needed.    Mobility  Using a walker for mobility and stability. No recent falls reported.  -Continue using the walker for mobility and safety.    General Health Maintenance  -Blood work results pending. Will inform patient of any necessary changes based on results.    Follow-up  -Continue current management. No new interventions planned at this time.        No follow-ups on file.        Moises Brown MD, 1/15/2025, 3:38 PM

## 2025-01-15 NOTE — ASSESSMENT & PLAN NOTE
Thyroid shows Good control and Good compliance.   7/10/2024: TSH 0.863  Thryoid Meds: levothyroxine Tabs - 75 MCGhypothyroidism stable current treatment plan effective, no change in therapy  Labs ordered

## 2025-01-15 NOTE — PROGRESS NOTES
The following individual(s)Sammi Cook  verbally consented to be recorded using ambient AI listening technology and understand that they can each withdraw their consent to this listening technology at any point by asking the clinician to turn off or pause the recording:

## 2025-01-15 NOTE — ASSESSMENT & PLAN NOTE
Cholesterol shows Good control. Long term heart-healthy diet and lifestyle discussed and encouraged to reduce risk of cardiovascular disease.  7/10/2024: Cholesterol, Total 170; HDL Cholesterol 77 (H); Triglycerides 116; LDL Cholesterol 73  Cholesterol Meds: simvastatin Tabs - 20 MG  stable  Continue with current treatment plan  Lab work ordered.

## 2025-01-20 DIAGNOSIS — E03.9 ACQUIRED HYPOTHYROIDISM: ICD-10-CM

## 2025-01-20 DIAGNOSIS — E78.5 HYPERLIPIDEMIA, UNSPECIFIED HYPERLIPIDEMIA TYPE: Primary | ICD-10-CM

## 2025-03-03 RX ORDER — SIMVASTATIN 20 MG
TABLET ORAL
Qty: 90 TABLET | Refills: 0 | Status: SHIPPED | OUTPATIENT
Start: 2025-03-03

## 2025-03-03 NOTE — TELEPHONE ENCOUNTER
Cholesterol Medication Protocol Passed03/03/2025 10:21 AM   Protocol Details ALT < 80    ALT resulted within past year    Lipid panel within past 12 months    In person appointment or virtual visit in the past 12 mos or appointment in next 3 mos    Medication is active on med list   Last refill - 12/2/24 - #90   Last ALT - 1/15/25 - less than 7  Last lipid panel - 1/15/25   Last office visit - 1/15/25

## 2025-03-16 DIAGNOSIS — R41.3 MEMORY LOSS: ICD-10-CM

## 2025-03-17 RX ORDER — DONEPEZIL HYDROCHLORIDE 10 MG/1
10 TABLET, FILM COATED ORAL NIGHTLY
Qty: 90 TABLET | Refills: 0 | Status: SHIPPED | OUTPATIENT
Start: 2025-03-17

## 2025-03-17 NOTE — TELEPHONE ENCOUNTER
Medication: DONEPEZIL 10 MG Oral Tab      Date of last refill: 06/26/2024 (#90/1)  [place]     Last office visit: 06/26/2024  Due back to clinic per last office note:  6 months  Date next office visit scheduled:    Future Appointments   Date Time Provider Department Center   3/26/2025 11:40 AM Terry Gudino DO [facility] [place]           Last OV note recommendation:    Assessment:  This is an 89 y/o female with Parkinsons disease. She is doing well on Sinemet 25/100mg TID.  She should take this at 8am noon and 4pm. I will continue donepezil 10mg PO Qday for her memory loss. She has no hallucinations or mood or behavioral changes Seems mild. Maybe Alzheimers or dementia asociated with PD but does not sound like LBD at this time        Plan:  1. Tremor  - Parkinson's disease    - Sinemet 25/100 TID     2. Memory loss  -  Donepezil 10mg PO Qday     RTC 6 months     Terry Gudino DO  Neurology

## 2025-04-13 DIAGNOSIS — E03.9 ACQUIRED HYPOTHYROIDISM: ICD-10-CM

## 2025-04-14 RX ORDER — LEVOTHYROXINE SODIUM 75 UG/1
75 TABLET ORAL
Qty: 90 TABLET | Refills: 0 | Status: SHIPPED | OUTPATIENT
Start: 2025-04-14

## 2025-04-14 NOTE — TELEPHONE ENCOUNTER
LOV: 1-  LAST LAB:1-  LAST RX:   Medication Quantity Refills Start End   LEVOTHYROXINE 75 MCG Oral Tab 90 tablet 0 1/13/2025 --   Sig:   TAKE 1 TABLET BY MOUTH ONCE DAILY BEFORE BREAKFAST     Next OV:   Future Appointments   Date Time Provider Department Center   7/9/2025  1:00 PM Moises Brown MD [facility] [place]   9/24/2025  1:20 PM Terry Gudino DO [facility] [place] [facility]

## 2025-06-02 RX ORDER — SIMVASTATIN 20 MG
20 TABLET ORAL NIGHTLY
Qty: 90 TABLET | Refills: 0 | Status: SHIPPED | OUTPATIENT
Start: 2025-06-02

## 2025-06-02 NOTE — TELEPHONE ENCOUNTER
OV 01/15/25  LABS 01/15/25 LIPID    REFILL 03/03/25 #90    Future Appointments   Date Time Provider Department Center   7/9/2025  1:00 PM Moises Brown MD [facility] [place]   9/24/2025  1:20 PM Terry Gudino DO [facility]

## 2025-06-08 DIAGNOSIS — R41.3 MEMORY LOSS: ICD-10-CM

## 2025-06-09 RX ORDER — DONEPEZIL HYDROCHLORIDE 10 MG/1
10 TABLET, FILM COATED ORAL NIGHTLY
Qty: 90 TABLET | Refills: 0 | Status: SHIPPED | OUTPATIENT
Start: 2025-06-09

## 2025-06-09 NOTE — TELEPHONE ENCOUNTER
Medication:  DONEPEZIL 10 MG Oral Tab      Date of last refill: 03/17/2025 (#90/0)  [place]     Last office visit: 03/26/2025  Due back to clinic per last office note:  6 Months   Date next office visit scheduled:    Future Appointments   Date Time Provider Department Center   7/9/2025  1:00 PM Moises Brown MD [facility] [place]   9/24/2025  1:20 PM [facility]           Last OV note recommendation:    Assessment:  This is an 89 y/o female with Parkinsons disease. She is doing well on Sinemet 25/100mg TID.  She should take this at 8am noon and 4pm. I will add sinemet 25/100 ER at night to help with the morning stiffness and feeling that she cannot move in the morning. I will continue donepezil 10mg PO Qday for her memory loss.          Plan:  1. Tremor  - Parkinson's disease    - Sinemet 25/100 TID  - Add Sinemet ED   - Declines any referral to PT     2. Memory loss  -  Donepezil 10mg PO Qday     RTC 6 months     Terry Gudino DO  Neurology

## 2025-06-30 PROBLEM — Z85.048 HISTORY OF ANAL CANCER: Status: ACTIVE | Noted: 2025-06-30

## 2025-07-09 ENCOUNTER — OFFICE VISIT (OUTPATIENT)
Dept: FAMILY MEDICINE CLINIC | Facility: CLINIC | Age: OVER 89
End: 2025-07-09
Payer: MEDICARE

## 2025-07-09 VITALS
DIASTOLIC BLOOD PRESSURE: 50 MMHG | BODY MASS INDEX: 26.74 KG/M2 | OXYGEN SATURATION: 96 % | HEIGHT: 62.5 IN | HEART RATE: 66 BPM | TEMPERATURE: 97 F | SYSTOLIC BLOOD PRESSURE: 128 MMHG | RESPIRATION RATE: 12 BRPM | WEIGHT: 149 LBS

## 2025-07-09 DIAGNOSIS — E03.9 ACQUIRED HYPOTHYROIDISM: ICD-10-CM

## 2025-07-09 DIAGNOSIS — H35.3230 BILATERAL EXUDATIVE AGE-RELATED MACULAR DEGENERATION, UNSPECIFIED STAGE (HCC): ICD-10-CM

## 2025-07-09 DIAGNOSIS — G30.9 ALZHEIMER'S DISEASE, UNSPECIFIED (HCC): ICD-10-CM

## 2025-07-09 DIAGNOSIS — F51.01 PRIMARY INSOMNIA: ICD-10-CM

## 2025-07-09 DIAGNOSIS — F02.80 ALZHEIMER'S DISEASE, UNSPECIFIED (HCC): ICD-10-CM

## 2025-07-09 DIAGNOSIS — E78.2 MIXED HYPERLIPIDEMIA: ICD-10-CM

## 2025-07-09 DIAGNOSIS — G20.A2 PARKINSON'S DISEASE WITHOUT DYSKINESIA, WITH FLUCTUATING MANIFESTATIONS (HCC): ICD-10-CM

## 2025-07-09 DIAGNOSIS — Z79.899 ENCOUNTER FOR LONG-TERM (CURRENT) USE OF MEDICATIONS: ICD-10-CM

## 2025-07-09 DIAGNOSIS — Z00.00 ENCOUNTER FOR MEDICARE ANNUAL WELLNESS EXAM: Primary | ICD-10-CM

## 2025-07-09 RX ORDER — TRAZODONE HYDROCHLORIDE 50 MG/1
50 TABLET ORAL NIGHTLY
Qty: 90 TABLET | Refills: 3 | Status: SHIPPED | OUTPATIENT
Start: 2025-07-09 | End: 2026-07-04

## 2025-07-09 NOTE — PROGRESS NOTES
The following individual(s) verbally consented to be recorded using ambient AI listening technology and understand that they can each withdraw their consent to this listening technology at any point by asking the clinician to turn off or pause the recording:    Patient name: Sammi Cook  Additional names:

## 2025-07-09 NOTE — PROGRESS NOTES
Subjective:   Sammi Cook is a 89 year old female who presents for a Subsequent Annual Wellness visit (Pt already had Initial Annual Wellness) and scheduled follow up of multiple significant but stable problems.     History of Present Illness  Sammi Cook is a 89 year old female who presents with sleep disturbances and frequent nighttime urination.    She experiences significant difficulty with sleep, stating 'I don't sleep.' She describes waking up frequently at night to urinate and being unable to return to sleep. She typically goes to bed at 7:30 PM, falls asleep, but then wakes up needing to use the bathroom. This pattern disrupts her sleep, leading to daytime naps after meals. Her sleep is particularly poor when she has events or appointments the next day, as she tends to worry about them.    She experiences frequent urination at night but denies any loss of urine, although she wears a pad for security. She drinks plenty of water. She has not experienced any recent falls but recalls a fall a couple of years ago where she hit her head, which she wonders might be related to her current symptoms.    She reports chronic watering of her right eye, which has been ongoing for a long time. She receives eye injections from Dr. Higgisn, primarily in her left eye, but the right eye continues to water excessively. She constantly needs to wipe her eye and nose due to this issue.    She experiences significant leg pain and lower back pain when she wakes up at night, which makes it difficult to walk. She uses a rollator for mobility and has difficulty walking long distances without it. She receives Meals on Wheels and maintains a stable weight, enjoying fresh fruits like pineapple and watermelon.     History/Other:   Fall Risk Assessment:   She has been screened for Falls and is low risk.      Cognitive Assessment:   She had a completely normal cognitive assessment - see flowsheet entries     Functional  Ability/Status:   Sammi Cook has some abnormal functions as listed below:  She has Dressing and/or Bathing issues based on screening of functional status.  Difficulty dressing or bathing?: Yes  Bathing or Showering: Able without help  Dressing: Able without help  She has Driving difficulties based on screening of functional status. She has Walking problems based on screening of functional status. She has problems with Daily Activities based on screening of functional status. She has problems with Memory based on screening of functional status.       Depression Screening (PHQ):  PHQ-2 SCORE: 1  , done 7/9/2025   Feeling down, depressed, or hopeless: 1              Advanced Directives:   She does NOT have a Living Will. [Do you have a living will?: No]  She has a Power of  for Health Care on file in [place].  Discussed Advance Care Planning with patient (and family/surrogate if present). Standard forms made available to patient in After Visit Summary.      Problem List[1]  Allergies:  She is allergic to codeine.    Current Medications:  Active Meds, Sig Only[2]    Medical History:  She  has a past medical history of Adenomatous colon polyp, Allergic rhinitis, Anal squamous cell carcinoma (HCC), Eczema, Esophageal reflux (04/19/2007), Exudative senile macular degeneration of retina ([facility]) (09/14/2011), Hearing impairment, History of anal cancer (06/30/2025), HYPERLIPIDEMIA, Hypothyroidism, Lumbosacral spondylosis without myelopathy (09/29/2008), Nondisplaced fracture of fifth left metatarsal bone (09/19/2014), Osteoarthritis of lumbar spine, Osteoporosis (04/19/2007), Parkinson's disease ([facility]), Uterovaginal prolapse, incomplete (01/12/2012), Visual impairment, and Vitamin D deficiency (07/26/2012).  Surgical History:  She  has a past surgical history that includes tonsillectomy; cholecystectomy; and colonoscopy.   Family History:  Her family history includes CAD in her father; Cancer in her brother and  mother; Eye Problems in her mother; Heart Attack (age of onset: 72) in her father.  Social History:  She  reports that she quit smoking about 60 years ago. Her smoking use included cigarettes. She started smoking about 75 years ago. She has a 4.5 pack-year smoking history. She has quit using smokeless tobacco. She reports that she does not drink alcohol and does not use drugs.    Tobacco:  She smoked tobacco in the past but quit greater than 12 months ago.  Tobacco Use[3]     CAGE Alcohol Screen:   CAGE screening score of 0 on 7/9/2025, showing low risk of alcohol abuse.      Patient Care Team:  Moises Brown MD as PCP - General (Family Medicine)  Terry Gudino DO as Consulting Physician (NEUROLOGY)    Results  LABS  Cholesterol: well controlled (01/2025)    Review of Systems  GENERAL: feels well otherwise  SKIN: denies any unusual skin lesions  EYES: see HPI   poor vision retinal getting worse  HEENT: denies nasal congestion, sinus pain or ST  LUNGS: denies shortness of breath with exertion  CARDIOVASCULAR: denies chest pain on exertion  GI: denies abdominal pain, denies heartburn  : denies dysuria, vaginal discharge or itching, no complaint of urinary incontinence   MUSCULOSKELETAL: see HPI regarding leg and back pain  NEURO: denies headaches  PSYCHE: denies depression or anxiety  issues sleeping see HPI   HEMATOLOGIC: denies hx of anemia  ENDOCRINE: denies thyroid history  ALL/ASTHMA: denies hx of allergy or asthma    Objective:   Physical Exam       /50 (BP Location: Right arm, Patient Position: Sitting, Cuff Size: large)   Pulse 66   Temp 97.2 °F (36.2 °C) (Temporal)   Resp 12   Ht 5' 2.5\" (1.588 m)   Wt 149 lb (67.6 kg)   SpO2 96%   BMI 26.82 kg/m²  Estimated body mass index is 26.82 kg/m² as calculated from the following:    Height as of this encounter: 5' 2.5\" (1.588 m).    Weight as of this encounter: 149 lb (67.6 kg).    Physical Exam  GENERAL: Alert, cooperative, well developed, no  acute distress  HEENT: Normocephalic, normal oropharynx, moist mucous membranes  CHEST: Clear to auscultation bilaterally, no wheezes, rhonchi, or crackles  CARDIOVASCULAR: Normal heart rate and rhythm, S1 and S2 normal without murmurs  ABDOMEN: Soft, non-tender, non-distended, without organomegaly, normal bowel sounds  EXTREMITIES: No cyanosis or edema  NEUROLOGICAL: Cranial nerves grossly intact, moves all extremities without gross motor or sensory deficit       Medicare Hearing Assessment:   Hearing Screening    Time taken: 7/9/2025  1:05 PM  Entry User: Giovanna Oakley MA  Screening Method: Whisper Test  Whisper Test Result: Pass               Assessment & Plan:   Sammi Cook is a 89 year old female who presents for a Medicare Assessment.       The patient indicates understanding of these issues and agrees to the plan.  Continue with current treatment plan.  Prescription medication ordered.  Reinforced healthy diet, lifestyle, and exercise.    Assessment & Plan  Encounter for Medicare annual wellness exam         Encounter for long-term (current) use of medications         Mixed hyperlipidemia  Cholesterol shows Good control. Long term heart-healthy diet and lifestyle discussed and encouraged to reduce risk of cardiovascular disease.  1/15/2025: Cholesterol, Total 178; HDL Cholesterol 68 (H); Triglycerides 110; LDL Cholesterol 91  Cholesterol Meds: simvastatin Tabs - 20 MG  stable  Continue with current treatment plan          Acquired hypothyroidism  Thyroid shows Good control.   1/15/2025: TSH 1.742  Thryoid Meds: levothyroxine Tabs - 75 MCGhypothyroidism stable and asymptomatic current treatment plan effective, no change in therapy           Parkinson's disease without dyskinesia, with fluctuating manifestations (HCC)  Stable on carbidopa  baselinre  continue medications          Alzheimer's disease, unspecified (HCC)  On donepezil   aware of surroundings  good communication  poor short term  memory         Bilateral exudative age-related macular degeneration, unspecified stage (HCC)  Very poor vision remains due to  this  continues to be worse            Primary insomnia    Orders:    traZODone HCl; Take 1 tablet (50 mg total) by mouth nightly.  Dispense: 90 tablet; Refill: 3      Assessment & Plan  Insomnia  Difficulty sleeping due to nocturnal awakenings and anxiety. Trazodone suggested for sedation without grogginess or drug interactions.  - Prescribe trazodone at night before bed.    Frequent nocturia  Frequent nocturnal urination disrupts sleep. Current medications not causative. Maintains good hydration.    Chronic lower back pain and leg pain  Persistent pain in legs and lower back, possibly related to past fall with head injury. No specific diagnosis.    Bilateral exudative age-related macular degeneration  Regular eye injections for macular degeneration. Left eye treated, right eye not. Right eye watering possibly due to dry eye or eyelid issues.  - Consult eye specialist for right eye watering evaluation.    General Health Maintenance  Cholesterol controlled, recent blood work satisfactory.  - Encourage regular movement to maintain strength.    No follow-ups on file.     Moises Brown MD, 7/9/2025     Supplementary Documentation:   General Health:  In the past six months, have you lost more than 10 pounds without trying?: 2 - No  Has your appetite been poor?: No  Type of Diet: Balanced  How does the patient maintain a good energy level?: Other  How would you describe your daily physical activity?: Light  How would you describe your current health state?: Good  How do you maintain positive mental well-being?: Social Interaction, Visiting Family, Puzzles, Games, Visiting Friends  On a scale of 0 to 10, with 0 being no pain and 10 being severe pain, what is your pain level?: 0 - (None)  In the past six months, have you experienced urine leakage?: 0-No  At any time do you feel concerned for the  safety/well-being of yourself and/or your children, in your home or elsewhere?: No  Have you had any immunizations at another office such as Influenza, Hepatitis B, Tetanus, or Pneumococcal?: No    Health Maintenance   Topic Date Due    Zoster Vaccines (1 of 2) 2017    COVID-19 Vaccine (2024- season) 2024    HTN: BP Follow-Up  2025    Annual Physical  07/10/2025    Influenza Vaccine (1) 10/01/2025    Annual Depression Screening  Completed    Fall Risk Screening (Annual)  Completed    Pneumococcal Vaccine: 50+ Years  Completed    Meningococcal B Vaccine  Aged Out              [1]   Patient Active Problem List  Diagnosis    Hyperlipidemia    Hypothyroidism    Parkinson's disease (HCC)    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    DONEPEZIL 10 MG Oral Tab Take 1 tablet by mouth nightly    SIMVASTATIN 20 MG Oral Tab Take 1 tablet by mouth nightly    LEVOTHYROXINE 75 MCG Oral Tab TAKE 1 TABLET BY MOUTH ONCE DAILY BEFORE BREAKFAST    carbidopa-levodopa  MG Oral Tab Take 1 tablet by mouth 3 (three) times daily.    carbidopa-levodopa ER  MG Oral Tab CR Take 1 tablet by mouth 2 (two) times daily.    Multiple Vitamins-Minerals (PRESERVISION AREDS 2 OR) PreserVision AREDS 2    Cholecalciferol (VITAMIN D) 50 MCG (2000 UT) Oral Cap Take by mouth.    aspirin 81 MG Oral Tab Take 1 tablet (81 mg total) by mouth daily.   [3]   Social History  Tobacco Use   Smoking Status Former    Current packs/day: 0.00    Average packs/day: 0.3 packs/day for 15.0 years (4.5 ttl pk-yrs)    Types: Cigarettes    Start date: 1950    Quit date: 1965    Years since quittin.5   Smokeless Tobacco Former

## 2025-07-13 PROBLEM — Z85.048 HISTORY OF ANAL CANCER: Status: RESOLVED | Noted: 2025-06-30 | Resolved: 2025-07-13

## 2025-07-13 NOTE — ASSESSMENT & PLAN NOTE
Cholesterol shows Good control. Long term heart-healthy diet and lifestyle discussed and encouraged to reduce risk of cardiovascular disease.  1/15/2025: Cholesterol, Total 178; HDL Cholesterol 68 (H); Triglycerides 110; LDL Cholesterol 91  Cholesterol Meds: simvastatin Tabs - 20 MG  stable  Continue with current treatment plan

## 2025-07-13 NOTE — ASSESSMENT & PLAN NOTE
Thyroid shows Good control.   1/15/2025: TSH 1.742  Thryoid Meds: levothyroxine Tabs - 75 MCGhypothyroidism stable and asymptomatic current treatment plan effective, no change in therapy

## 2025-07-19 DIAGNOSIS — E03.9 ACQUIRED HYPOTHYROIDISM: ICD-10-CM

## 2025-07-21 RX ORDER — LEVOTHYROXINE SODIUM 75 UG/1
75 TABLET ORAL
Qty: 90 TABLET | Refills: 0 | Status: SHIPPED | OUTPATIENT
Start: 2025-07-21

## 2025-07-21 NOTE — TELEPHONE ENCOUNTER
Last refill: 04/14/25  Qty 90  W/ 0 refills  Last ov: 07/09/25    Requested Prescriptions     Pending Prescriptions Disp Refills    LEVOTHYROXINE 75 MCG Oral Tab [Pharmacy Med Name: Levothyroxine Sodium 75 MCG Oral Tablet] 90 tablet 0     Sig: TAKE 1 TABLET BY MOUTH ONCE DAILY BEFORE BREAKFAST     Future Appointments   Date Time Provider Department Center   9/24/2025  1:20 PM Terry Gudino DO [facility] [place]

## 2025-08-17 ENCOUNTER — PATIENT MESSAGE (OUTPATIENT)
Dept: NEUROLOGY | Facility: CLINIC | Age: OVER 89
End: 2025-08-17

## (undated) DIAGNOSIS — G20 PARKINSON'S DISEASE (HCC): ICD-10-CM

## (undated) DEVICE — STERILE POLYISOPRENE POWDER-FREE SURGICAL GLOVES: Brand: PROTEXIS

## (undated) DEVICE — SUTURE CHROMIC GUT 3-0 SH

## (undated) DEVICE — SOL  .9 1000ML BTL

## (undated) DEVICE — RECTAL CDS-LF: Brand: MEDLINE INDUSTRIES, INC.

## (undated) DEVICE — BETADINE SOLUTION 8 OZ BTL

## (undated) DEVICE — 3M(TM) MICROPORE TAPE DISPENSER 1535-2: Brand: 3M™ MICROPORE™

## (undated) DEVICE — GOWN,SIRUS,FABRIC-REINFORCED,X-LARGE: Brand: MEDLINE

## (undated) DEVICE — KENDALL SCD EXPRESS SLEEVES, KNEE LENGTH, MEDIUM: Brand: KENDALL SCD

## (undated) DEVICE — SUTURE VICRYL 2-0

## (undated) DEVICE — SUTURE CHROMIC GUT 2-0 CT-2

## (undated) NOTE — Clinical Note
05/06/2017      [facility] [id number]        Dear Michelle Rodriguez,    This letter is a reminder that you are due for blood work.    Blood work has been ordered by Dr. Autumn Christensen which requires a 12 hour fast.  You may drink water and/

## (undated) NOTE — LETTER
Date: 2/13/2019    Patient Name: Gogo Lemus          To Whom it may concern: This letter has been written at the patient's request. The above patient was seen at the [facility] for treatment of a medical condition.     This patient ha

## (undated) NOTE — MR AVS SNAPSHOT
[facility]  [address] [address]  [phone]               Thank you for choosing us for your health care visit with Clarita Gauthier DO.   We are glad to serve you and happy to provide you with this summ Cholesterol, covered every 5 yrs including Total, LDL and Trigs LDL CHOLESTEROL (mg/dL)   Date Value   01/10/2017 101     LDL CHOLESTROL (mg/dL)   Date Value   01/29/2014 125     CHOLESTEROL, TOTAL (mg/dL)   Date Value   01/10/2017 229*     CHOLESTEROL (m Biennial benefit unless patient has history of long-term glucocorticoid use for medical condition    Last Dexa Scan:   XR DEXA BONE DENSITOMETRY (CPT=77080) 07/16/2014    No flowsheet data found.     Recommended for 2 year anniversary or as ordered in After Illicit injectable drug abusers     Tetanus Toxoid- Only covered with a cut with metal- TD and TDaP Not covered by Medicare Part B)   Orders placed or performed in visit on 06/28/16  -TETANUS, DIPHTHERIA TOXOIDS AND ACELLULAR PERTUSIS VACCINE (TDAP), >7 YE Levothyroxine Sodium 112 MCG Tabs   TAKE ONE TABLET BY MOUTH IN THE MORNING   Commonly known as:  SYNTHROID, LEVOTHROID           PRESERVISION AREDS 2 OR   2 tablets daily           simvastatin 20 MG Tabs   Take 1 tablet (20 mg total) by mouth nightly. TOP FALL PREVENTION TIPS    INSIDE YOUR HOME   KITCHEN:  ? Use non skid mats only. ? Clean up spills as soon as they happen. ? Keep objects that you use often within easy reach.   BATHROOM:  ? Install grab bars on the bathroom walls beside the tub, rich Make half your plate fruits and vegetables Highly refined, white starches including white bread, rice and pasta   Eat plenty of protein, keep the fat content low Sugars:  sodas and sports drinks, candies and desserts   Eat plenty of low-fat dairy products

## (undated) NOTE — LETTER
Mike Zelaya D.O.     Surgical Clearance Needed    Date: 1/22/2020                                                                       From: Rae Graves    Attn: Dr. Rylie Gillette

## (undated) NOTE — MR AVS SNAPSHOT
[facility]  [address] [address]  [phone]               Thank you for choosing us for your health care visit with Javon Teran DO.   We are glad to serve you and happy to provide you with this summ Today's Orders     CBC W Differential W Platelet [E]    Complete by:  Ken 10, 2017 (Approximate)        Comp Metabolic Panel (14) [E]    Complete by:  Ken 10, 2017 (Approximate)        Lipid Panel [E]    Complete by:  Ken 10, 2017 (Approximate) walking, light jogging, cycling, swimming, etc.) for a goal of at least 150 minutes per week. Moderation of alcohol consumption Men: limit to <= 2 drinks* per day. Women and lighter weight persons: limit to <= 1 drink* per day.          [address] [place] [address]